# Patient Record
Sex: FEMALE | Race: BLACK OR AFRICAN AMERICAN | NOT HISPANIC OR LATINO | Employment: FULL TIME | URBAN - METROPOLITAN AREA
[De-identification: names, ages, dates, MRNs, and addresses within clinical notes are randomized per-mention and may not be internally consistent; named-entity substitution may affect disease eponyms.]

---

## 2020-06-30 ENCOUNTER — OFFICE VISIT (OUTPATIENT)
Dept: URGENT CARE | Facility: CLINIC | Age: 25
End: 2020-06-30
Payer: COMMERCIAL

## 2020-06-30 VITALS
WEIGHT: 160 LBS | HEART RATE: 98 BPM | BODY MASS INDEX: 28.35 KG/M2 | HEIGHT: 63 IN | SYSTOLIC BLOOD PRESSURE: 128 MMHG | DIASTOLIC BLOOD PRESSURE: 86 MMHG | TEMPERATURE: 99 F | OXYGEN SATURATION: 98 %

## 2020-06-30 DIAGNOSIS — M77.9 TENDONITIS: Primary | ICD-10-CM

## 2020-06-30 DIAGNOSIS — M79.672 LEFT FOOT PAIN: ICD-10-CM

## 2020-06-30 PROCEDURE — 99203 OFFICE O/P NEW LOW 30 MIN: CPT | Mod: S$GLB,,, | Performed by: PHYSICIAN ASSISTANT

## 2020-06-30 PROCEDURE — 73630 X-RAY EXAM OF FOOT: CPT | Mod: FY,LT,S$GLB, | Performed by: RADIOLOGY

## 2020-06-30 PROCEDURE — 73610 XR ANKLE COMPLETE 3 VIEW LEFT: ICD-10-PCS | Mod: FY,LT,S$GLB, | Performed by: RADIOLOGY

## 2020-06-30 PROCEDURE — 73610 X-RAY EXAM OF ANKLE: CPT | Mod: FY,LT,S$GLB, | Performed by: RADIOLOGY

## 2020-06-30 PROCEDURE — 99203 PR OFFICE/OUTPT VISIT, NEW, LEVL III, 30-44 MIN: ICD-10-PCS | Mod: S$GLB,,, | Performed by: PHYSICIAN ASSISTANT

## 2020-06-30 PROCEDURE — 73630 XR FOOT COMPLETE 3 VIEW LEFT: ICD-10-PCS | Mod: FY,LT,S$GLB, | Performed by: RADIOLOGY

## 2020-06-30 NOTE — LETTER
June 30, 2020      [facility]  [place]  [place] [address]  Phone: [phone]  Fax: [phone]       Patient: Nae Escalante   YOB: 1995  Date of Visit: 06/30/2020    To Whom It May Concern:    Dana Escalante  was at [facility] on 06/30/2020. She may return to work/school on 6/6/2020 with no restrictions. If you have any questions or concerns, or if I can be of further assistance, please do not hesitate to contact me.    Sincerely,    Jojo Mancilla PA-C

## 2020-06-30 NOTE — PROGRESS NOTES
"Subjective:       Patient ID: Nae Escalante is a 25 y.o. female.    Vitals:  height is 5' 3" (1.6 m) and weight is 72.6 kg (160 lb). Her temperature is 99.2 °F (37.3 °C). Her blood pressure is 128/86 and her pulse is 98. Her oxygen saturation is 98%.     Chief Complaint: Foot Pain    This is a 25-year-old female presents complaining of left ankle pain onset yesterday.  States that she was exercising, doing step ups from lunge to knee high.  He did not feel any immediate pain.  She started with pain a few hours later that felt like soreness around her left lateral malleolus, dorsal foot.  Denies numbness or tingling.  Denies any changes in color.  Reports mild swelling.  She did take ibuprofen with great relief.    Foot Pain  This is a new problem. The current episode started yesterday. The problem occurs constantly. The problem has been unchanged. Pertinent negatives include no arthralgias, chest pain, chills, congestion, coughing, diaphoresis, fatigue, fever, headaches, joint swelling, myalgias, nausea, neck pain, rash, sore throat, vertigo, vomiting or weakness. The symptoms are aggravated by walking. She has tried acetaminophen for the symptoms. The treatment provided no relief.       Constitution: Negative for chills, sweating, fatigue and fever.   HENT: Negative for tinnitus, facial swelling, congestion, sinus pain and sore throat.    Neck: Negative for neck pain, neck stiffness and painful lymph nodes.   Cardiovascular: Negative for chest pain and leg swelling.   Eyes: Negative for eye pain, photophobia, vision loss, double vision and blurred vision.   Respiratory: Negative for cough and shortness of breath.    Gastrointestinal: Negative for nausea, vomiting and diarrhea.   Genitourinary: Negative for dysuria, frequency, urgency, history of kidney stones and missed menses.   Musculoskeletal: Negative for trauma, joint pain, joint swelling, muscle cramps and muscle ache.   Skin: Negative for color change, " pale, rash, wound, lesion and bruising.   Allergic/Immunologic: Negative for seasonal allergies.   Neurological: Negative for dizziness, history of vertigo, light-headedness, passing out, facial drooping, speech difficulty, coordination disturbances, loss of balance, headaches, history of migraines, disorientation and loss of consciousness.   Hematologic/Lymphatic: Negative for swollen lymph nodes.   Psychiatric/Behavioral: Negative for disorientation, confusion, nervous/anxious, sleep disturbance and depression. The patient is not nervous/anxious.        Objective:      Physical Exam   Constitutional: She is oriented to person, place, and time. She appears well-developed. No distress.   HENT:   Head: Normocephalic and atraumatic.   Right Ear: External ear normal.   Left Ear: External ear normal.   Eyes: Conjunctivae are normal.   Neck: Normal range of motion. Neck supple.   Cardiovascular: Normal rate and regular rhythm.   Pulmonary/Chest: Effort normal and breath sounds normal.   Abdominal: She exhibits no distension.   Musculoskeletal:      Left ankle: Achilles tendon normal.      Comments: There is mild swelling to the left dorsal foot.  There is mild tenderness to the left lateral malleolus, 3rd metatarsal, 5th metatarsal.  No extreme point tenderness.    2+ DP pulses bilaterally  Sensation intact bilaterally    Full active range of motion bilateral ankles on dorsiflexion and plantar flexion as well as inversion and eversion.  Mild pain in left ankle on these movements.  Able to ambulate without difficulty    Achilles tendon is intact   Neurological: She is alert and oriented to person, place, and time.   Skin: Skin is warm, dry and not diaphoretic. Capillary refill takes less than 2 seconds.   Psychiatric: Her behavior is normal. Judgment and thought content normal.   Nursing note and vitals reviewed.  X-ray Ankle Complete Left    Result Date: 6/30/2020  EXAMINATION: XR FOOT COMPLETE 3 VIEW LEFT; XR ANKLE  COMPLETE 3 VIEW LEFT CLINICAL HISTORY: overuse injury, pain over lat malleolus as well as 5th metatarsal and 3rd metatarsal;.  Pain in left foot TECHNIQUE: AP, lateral and oblique views of the left foot were performed.  Three views of the left ankle also. COMPARISON: None FINDINGS: Alignment is satisfactory.  No acute fracture, subluxation or dislocation.  No mass or foreign body.  No significant arthropathy. Ankle mortise is intact.  Soft tissues appear adequately maintained. No acute abnormality of the left foot or ankle.     No acute radiographic abnormality. Electronically signed by: Grzegorz Vernon Date:    06/30/2020 Time:    16:51    X-ray Foot Complete Left    Result Date: 6/30/2020  EXAMINATION: XR FOOT COMPLETE 3 VIEW LEFT; XR ANKLE COMPLETE 3 VIEW LEFT CLINICAL HISTORY: overuse injury, pain over lat malleolus as well as 5th metatarsal and 3rd metatarsal;.  Pain in left foot TECHNIQUE: AP, lateral and oblique views of the left foot were performed.  Three views of the left ankle also. COMPARISON: None FINDINGS: Alignment is satisfactory.  No acute fracture, subluxation or dislocation.  No mass or foreign body.  No significant arthropathy. Ankle mortise is intact.  Soft tissues appear adequately maintained. No acute abnormality of the left foot or ankle.     No acute radiographic abnormality. Electronically signed by: Grzegorz Vernon Date:    06/30/2020 Time:    16:51        Assessment:       1. Tendonitis    2. Left foot pain        Plan:       Recommend crutches, RICE  1 wk pcp follow up  ED precautions return precautions discussed    Tendonitis    Left foot pain  -     X-Ray Ankle Complete Left; Future; Expected date: 06/30/2020  -     X-Ray Foot Complete Left; Future; Expected date: 06/30/2020  -     Cancel: CRUTCHES FOR HOME USE    Labs reviewed, pertinent imaging reviewed, previous medical records, medical history, surgical history, social history, family history reviewed.       Patient Instructions    Rest  Ice  Compression  Elevation above the level of your heart  Crutches for 1 week  Follow-up primary care in 1 week    Please arrange follow up with your primary medical clinic as soon as possible. You must understand that you've received an Urgent Care treatment only and that you may be released before all of your medical problems are known or treated. You, the patient, will arrange for follow up as instructed. If your symptoms worsen or fail to improve you should go to the Emergency Room.  WE CANNOT RULE OUT ALL POSSIBLE CAUSES OF YOUR SYMPTOMS IN THE URGENT CARE SETTING PLEASE GO TO THE ER IF YOU FEELS YOUR CONDITION IS WORSENING OR YOU WOULD LIKE EMERGENT EVALUATION.          Tendonitis  A tendon is the thick fibrous cord that joins muscle to bone and allows joints to move. When a tendon becomes inflamed, it is called tendonitis. This can occur from overuse, injury, or infection. This usually involves the shoulders, forearm, wrist, hands and foot. Symptoms include pain, swelling and tenderness to the touch. Moving the joint increases the pain.  It takes 4 to 6 weeks for tendonitis to heal. It is treated by preventing motion of the tendon with a splint or brace and the use of anti-inflammatory medicine.  Home care  · Some people find relief with ice packs. These can be crushed or cubed ice in a plastic bag or a bag of frozen vegetables wrapped in a thin towel. Other people get better relief with heat. This can include a hot shower, hot bath, or a moist towel warmed in a microwave. Try each and use the method that feels best, for 15 to 20 minutes several times a day.  · Rest the inflamed joint and protect it from movement.  · You may use over-the-counter ibuprofen or naproxen to treat pain and inflammation, unless another medicine was prescribed. If you can't take these medicines, acetaminophen may help with the pain, but does not treat inflammation. If you have chronic liver or kidney disease or ever had a  stomach ulcer or gastrointestinal bleeding, talk with your doctor before using these medicines.  · As your symptoms improve, begin gradual motion at the involved joint.  Follow-up care  Follow up with your healthcare provider if you are not improving after 5 days of treatment.  When to seek medical advice  Call your healthcare provider right away if any of these occur:  · Redness over the painful area  · Increasing pain or swelling at the joint  · Fever (1 degree above your normal temperature) lasting 24 to 48 hours Or, whatever your healthcare provider told you to report based on your condition  Date Last Reviewed: 11/21/2015  © 6206-9289 [employer]. [address] [place] [address]. All rights reserved. This information is not intended as a substitute for professional medical care. Always follow your healthcare professional's instructions.

## 2020-06-30 NOTE — PATIENT INSTRUCTIONS
Rest  Ice  Compression  Elevation above the level of your heart  Crutches for 1 week  Follow-up primary care in 1 week    Please arrange follow up with your primary medical clinic as soon as possible. You must understand that you've received an Urgent Care treatment only and that you may be released before all of your medical problems are known or treated. You, the patient, will arrange for follow up as instructed. If your symptoms worsen or fail to improve you should go to the Emergency Room.  WE CANNOT RULE OUT ALL POSSIBLE CAUSES OF YOUR SYMPTOMS IN THE URGENT CARE SETTING PLEASE GO TO THE ER IF YOU FEELS YOUR CONDITION IS WORSENING OR YOU WOULD LIKE EMERGENT EVALUATION.          Tendonitis  A tendon is the thick fibrous cord that joins muscle to bone and allows joints to move. When a tendon becomes inflamed, it is called tendonitis. This can occur from overuse, injury, or infection. This usually involves the shoulders, forearm, wrist, hands and foot. Symptoms include pain, swelling and tenderness to the touch. Moving the joint increases the pain.  It takes 4 to 6 weeks for tendonitis to heal. It is treated by preventing motion of the tendon with a splint or brace and the use of anti-inflammatory medicine.  Home care  · Some people find relief with ice packs. These can be crushed or cubed ice in a plastic bag or a bag of frozen vegetables wrapped in a thin towel. Other people get better relief with heat. This can include a hot shower, hot bath, or a moist towel warmed in a microwave. Try each and use the method that feels best, for 15 to 20 minutes several times a day.  · Rest the inflamed joint and protect it from movement.  · You may use over-the-counter ibuprofen or naproxen to treat pain and inflammation, unless another medicine was prescribed. If you can't take these medicines, acetaminophen may help with the pain, but does not treat inflammation. If you have chronic liver or kidney disease or ever had a  stomach ulcer or gastrointestinal bleeding, talk with your doctor before using these medicines.  · As your symptoms improve, begin gradual motion at the involved joint.  Follow-up care  Follow up with your healthcare provider if you are not improving after 5 days of treatment.  When to seek medical advice  Call your healthcare provider right away if any of these occur:  · Redness over the painful area  · Increasing pain or swelling at the joint  · Fever (1 degree above your normal temperature) lasting 24 to 48 hours Or, whatever your healthcare provider told you to report based on your condition  Date Last Reviewed: 11/21/2015  © 3289-9741 [employer]. [address] [place] [address]. All rights reserved. This information is not intended as a substitute for professional medical care. Always follow your healthcare professional's instructions.

## 2020-12-04 DIAGNOSIS — Z01.84 ANTIBODY RESPONSE EXAMINATION: ICD-10-CM

## 2021-01-10 ENCOUNTER — OFFICE VISIT (OUTPATIENT)
Dept: URGENT CARE | Facility: CLINIC | Age: 26
End: 2021-01-10
Payer: COMMERCIAL

## 2021-01-10 VITALS
HEART RATE: 98 BPM | HEIGHT: 63 IN | TEMPERATURE: 99 F | BODY MASS INDEX: 29.23 KG/M2 | SYSTOLIC BLOOD PRESSURE: 132 MMHG | WEIGHT: 165 LBS | DIASTOLIC BLOOD PRESSURE: 85 MMHG | RESPIRATION RATE: 18 BRPM | OXYGEN SATURATION: 98 %

## 2021-01-10 DIAGNOSIS — M25.511 RIGHT SHOULDER PAIN, UNSPECIFIED CHRONICITY: Primary | ICD-10-CM

## 2021-01-10 PROCEDURE — 99214 PR OFFICE/OUTPT VISIT, EST, LEVL IV, 30-39 MIN: ICD-10-PCS | Mod: S$GLB,,, | Performed by: NURSE PRACTITIONER

## 2021-01-10 PROCEDURE — 73030 X-RAY EXAM OF SHOULDER: CPT | Mod: FY,RT,S$GLB, | Performed by: RADIOLOGY

## 2021-01-10 PROCEDURE — 99214 OFFICE O/P EST MOD 30 MIN: CPT | Mod: S$GLB,,, | Performed by: NURSE PRACTITIONER

## 2021-01-10 PROCEDURE — 73030 XR SHOULDER COMPLETE 2 OR MORE VIEWS RIGHT: ICD-10-PCS | Mod: FY,RT,S$GLB, | Performed by: RADIOLOGY

## 2021-01-10 PROCEDURE — 3008F BODY MASS INDEX DOCD: CPT | Mod: CPTII,S$GLB,, | Performed by: NURSE PRACTITIONER

## 2021-01-10 PROCEDURE — 3008F PR BODY MASS INDEX (BMI) DOCUMENTED: ICD-10-PCS | Mod: CPTII,S$GLB,, | Performed by: NURSE PRACTITIONER

## 2021-01-10 RX ORDER — IBUPROFEN 600 MG/1
600 TABLET ORAL 3 TIMES DAILY PRN
Qty: 30 TABLET | Refills: 0 | Status: SHIPPED | OUTPATIENT
Start: 2021-01-10 | End: 2021-10-27 | Stop reason: ALTCHOICE

## 2021-01-11 ENCOUNTER — OFFICE VISIT (OUTPATIENT)
Dept: ORTHOPEDICS | Facility: CLINIC | Age: 26
End: 2021-01-11
Payer: COMMERCIAL

## 2021-01-11 VITALS — WEIGHT: 165 LBS | BODY MASS INDEX: 29.23 KG/M2 | HEIGHT: 63 IN

## 2021-01-11 DIAGNOSIS — M75.41 IMPINGEMENT SYNDROME OF RIGHT SHOULDER: ICD-10-CM

## 2021-01-11 PROCEDURE — 99999 PR PBB SHADOW E&M-EST. PATIENT-LVL III: ICD-10-PCS | Mod: PBBFAC,,, | Performed by: ORTHOPAEDIC SURGERY

## 2021-01-11 PROCEDURE — 99203 PR OFFICE/OUTPT VISIT, NEW, LEVL III, 30-44 MIN: ICD-10-PCS | Mod: 25,S$GLB,, | Performed by: ORTHOPAEDIC SURGERY

## 2021-01-11 PROCEDURE — 3008F PR BODY MASS INDEX (BMI) DOCUMENTED: ICD-10-PCS | Mod: CPTII,S$GLB,, | Performed by: ORTHOPAEDIC SURGERY

## 2021-01-11 PROCEDURE — 20610 DRAIN/INJ JOINT/BURSA W/O US: CPT | Mod: RT,S$GLB,, | Performed by: ORTHOPAEDIC SURGERY

## 2021-01-11 PROCEDURE — 99999 PR PBB SHADOW E&M-EST. PATIENT-LVL III: CPT | Mod: PBBFAC,,, | Performed by: ORTHOPAEDIC SURGERY

## 2021-01-11 PROCEDURE — 3008F BODY MASS INDEX DOCD: CPT | Mod: CPTII,S$GLB,, | Performed by: ORTHOPAEDIC SURGERY

## 2021-01-11 PROCEDURE — 1125F PR PAIN SEVERITY QUANTIFIED, PAIN PRESENT: ICD-10-PCS | Mod: S$GLB,,, | Performed by: ORTHOPAEDIC SURGERY

## 2021-01-11 PROCEDURE — 20610 PR DRAIN/INJECT LARGE JOINT/BURSA: ICD-10-PCS | Mod: RT,S$GLB,, | Performed by: ORTHOPAEDIC SURGERY

## 2021-01-11 PROCEDURE — 99203 OFFICE O/P NEW LOW 30 MIN: CPT | Mod: 25,S$GLB,, | Performed by: ORTHOPAEDIC SURGERY

## 2021-01-11 PROCEDURE — 1125F AMNT PAIN NOTED PAIN PRSNT: CPT | Mod: S$GLB,,, | Performed by: ORTHOPAEDIC SURGERY

## 2021-01-11 RX ORDER — TRAMADOL HYDROCHLORIDE 50 MG/1
50 TABLET ORAL EVERY 6 HOURS PRN
Qty: 40 TABLET | Refills: 0 | Status: SHIPPED | OUTPATIENT
Start: 2021-01-11 | End: 2021-01-21

## 2021-01-11 RX ORDER — TRIAMCINOLONE ACETONIDE 40 MG/ML
40 INJECTION, SUSPENSION INTRA-ARTICULAR; INTRAMUSCULAR
Status: COMPLETED | OUTPATIENT
Start: 2021-01-11 | End: 2021-01-11

## 2021-01-11 RX ADMIN — TRIAMCINOLONE ACETONIDE 40 MG: 40 INJECTION, SUSPENSION INTRA-ARTICULAR; INTRAMUSCULAR at 04:01

## 2021-01-25 ENCOUNTER — PATIENT MESSAGE (OUTPATIENT)
Dept: ORTHOPEDICS | Facility: CLINIC | Age: 26
End: 2021-01-25

## 2021-01-26 DIAGNOSIS — M25.519 SHOULDER PAIN, UNSPECIFIED CHRONICITY, UNSPECIFIED LATERALITY: ICD-10-CM

## 2021-02-02 ENCOUNTER — TELEPHONE (OUTPATIENT)
Dept: ORTHOPEDICS | Facility: CLINIC | Age: 26
End: 2021-02-02

## 2021-02-03 ENCOUNTER — TELEPHONE (OUTPATIENT)
Dept: ORTHOPEDICS | Facility: CLINIC | Age: 26
End: 2021-02-03

## 2021-02-10 ENCOUNTER — IMMUNIZATION (OUTPATIENT)
Dept: PRIMARY CARE CLINIC | Facility: CLINIC | Age: 26
End: 2021-02-10
Payer: COMMERCIAL

## 2021-02-10 DIAGNOSIS — Z23 NEED FOR VACCINATION: Primary | ICD-10-CM

## 2021-02-10 PROCEDURE — 91300 PR SARS-COV- 2 COVID-19 VACCINE, NO PRSV, 30MCG/0.3ML, IM: CPT | Mod: PBBFAC | Performed by: INTERNAL MEDICINE

## 2021-02-10 PROCEDURE — 0001A PR IMMUNIZ ADMIN, SARS-COV-2 COVID-19 VACC, 30MCG/0.3ML, 1ST DOSE: CPT | Mod: PBBFAC | Performed by: INTERNAL MEDICINE

## 2021-02-10 RX ADMIN — RNA INGREDIENT BNT-162B2 0.3 ML: 0.23 INJECTION, SUSPENSION INTRAMUSCULAR at 02:02

## 2021-02-25 ENCOUNTER — OFFICE VISIT (OUTPATIENT)
Dept: ORTHOPEDICS | Facility: CLINIC | Age: 26
End: 2021-02-25
Payer: COMMERCIAL

## 2021-02-25 VITALS — WEIGHT: 165 LBS | HEIGHT: 63 IN | BODY MASS INDEX: 29.23 KG/M2

## 2021-02-25 DIAGNOSIS — M25.311 INSTABILITY OF BOTH SHOULDER JOINTS: Primary | ICD-10-CM

## 2021-02-25 DIAGNOSIS — M25.312 INSTABILITY OF BOTH SHOULDER JOINTS: Primary | ICD-10-CM

## 2021-02-25 PROCEDURE — 1126F PR PAIN SEVERITY QUANTIFIED, NO PAIN PRESENT: ICD-10-PCS | Mod: S$GLB,,, | Performed by: ORTHOPAEDIC SURGERY

## 2021-02-25 PROCEDURE — 99999 PR PBB SHADOW E&M-EST. PATIENT-LVL III: CPT | Mod: PBBFAC,,, | Performed by: ORTHOPAEDIC SURGERY

## 2021-02-25 PROCEDURE — 3008F PR BODY MASS INDEX (BMI) DOCUMENTED: ICD-10-PCS | Mod: CPTII,S$GLB,, | Performed by: ORTHOPAEDIC SURGERY

## 2021-02-25 PROCEDURE — 99999 PR PBB SHADOW E&M-EST. PATIENT-LVL III: ICD-10-PCS | Mod: PBBFAC,,, | Performed by: ORTHOPAEDIC SURGERY

## 2021-02-25 PROCEDURE — 99213 PR OFFICE/OUTPT VISIT, EST, LEVL III, 20-29 MIN: ICD-10-PCS | Mod: S$GLB,,, | Performed by: ORTHOPAEDIC SURGERY

## 2021-02-25 PROCEDURE — 99213 OFFICE O/P EST LOW 20 MIN: CPT | Mod: S$GLB,,, | Performed by: ORTHOPAEDIC SURGERY

## 2021-02-25 PROCEDURE — 3008F BODY MASS INDEX DOCD: CPT | Mod: CPTII,S$GLB,, | Performed by: ORTHOPAEDIC SURGERY

## 2021-02-25 PROCEDURE — 1126F AMNT PAIN NOTED NONE PRSNT: CPT | Mod: S$GLB,,, | Performed by: ORTHOPAEDIC SURGERY

## 2021-03-03 ENCOUNTER — IMMUNIZATION (OUTPATIENT)
Dept: PRIMARY CARE CLINIC | Facility: CLINIC | Age: 26
End: 2021-03-03
Payer: COMMERCIAL

## 2021-03-03 DIAGNOSIS — Z23 NEED FOR VACCINATION: Primary | ICD-10-CM

## 2021-03-03 PROCEDURE — 91300 PR SARS-COV- 2 COVID-19 VACCINE, NO PRSV, 30MCG/0.3ML, IM: CPT | Mod: S$GLB,,, | Performed by: INTERNAL MEDICINE

## 2021-03-03 PROCEDURE — 0002A PR IMMUNIZ ADMIN, SARS-COV-2 COVID-19 VACC, 30MCG/0.3ML, 2ND DOSE: CPT | Mod: CV19,S$GLB,, | Performed by: INTERNAL MEDICINE

## 2021-03-03 PROCEDURE — 91300 PR SARS-COV- 2 COVID-19 VACCINE, NO PRSV, 30MCG/0.3ML, IM: ICD-10-PCS | Mod: S$GLB,,, | Performed by: INTERNAL MEDICINE

## 2021-03-03 PROCEDURE — 0002A PR IMMUNIZ ADMIN, SARS-COV-2 COVID-19 VACC, 30MCG/0.3ML, 2ND DOSE: ICD-10-PCS | Mod: CV19,S$GLB,, | Performed by: INTERNAL MEDICINE

## 2021-03-03 RX ADMIN — Medication 0.3 ML: at 03:03

## 2021-03-19 ENCOUNTER — CLINICAL SUPPORT (OUTPATIENT)
Dept: REHABILITATION | Facility: HOSPITAL | Age: 26
End: 2021-03-19
Payer: COMMERCIAL

## 2021-03-19 DIAGNOSIS — M25.312 INSTABILITY OF BOTH SHOULDER JOINTS: ICD-10-CM

## 2021-03-19 DIAGNOSIS — M25.311 INSTABILITY OF BOTH SHOULDER JOINTS: ICD-10-CM

## 2021-03-19 PROCEDURE — 97140 MANUAL THERAPY 1/> REGIONS: CPT

## 2021-03-19 PROCEDURE — 97161 PT EVAL LOW COMPLEX 20 MIN: CPT

## 2021-03-25 ENCOUNTER — CLINICAL SUPPORT (OUTPATIENT)
Dept: REHABILITATION | Facility: HOSPITAL | Age: 26
End: 2021-03-25
Payer: COMMERCIAL

## 2021-03-25 DIAGNOSIS — M25.511 RIGHT SHOULDER PAIN, UNSPECIFIED CHRONICITY: Primary | ICD-10-CM

## 2021-03-25 PROCEDURE — 97110 THERAPEUTIC EXERCISES: CPT

## 2021-03-25 PROCEDURE — 97140 MANUAL THERAPY 1/> REGIONS: CPT

## 2021-04-01 ENCOUNTER — CLINICAL SUPPORT (OUTPATIENT)
Dept: REHABILITATION | Facility: HOSPITAL | Age: 26
End: 2021-04-01
Payer: COMMERCIAL

## 2021-04-01 DIAGNOSIS — M25.511 RIGHT SHOULDER PAIN, UNSPECIFIED CHRONICITY: Primary | ICD-10-CM

## 2021-04-01 PROCEDURE — 97140 MANUAL THERAPY 1/> REGIONS: CPT

## 2021-04-01 PROCEDURE — 97110 THERAPEUTIC EXERCISES: CPT

## 2021-04-06 ENCOUNTER — PATIENT MESSAGE (OUTPATIENT)
Dept: OBSTETRICS AND GYNECOLOGY | Facility: CLINIC | Age: 26
End: 2021-04-06

## 2021-04-08 ENCOUNTER — CLINICAL SUPPORT (OUTPATIENT)
Dept: REHABILITATION | Facility: HOSPITAL | Age: 26
End: 2021-04-08
Payer: COMMERCIAL

## 2021-04-08 DIAGNOSIS — M25.511 RIGHT SHOULDER PAIN, UNSPECIFIED CHRONICITY: Primary | ICD-10-CM

## 2021-04-08 PROCEDURE — 97110 THERAPEUTIC EXERCISES: CPT

## 2021-04-08 PROCEDURE — 97140 MANUAL THERAPY 1/> REGIONS: CPT

## 2021-04-09 ENCOUNTER — OFFICE VISIT (OUTPATIENT)
Dept: OBSTETRICS AND GYNECOLOGY | Facility: CLINIC | Age: 26
End: 2021-04-09
Payer: COMMERCIAL

## 2021-04-09 VITALS
HEIGHT: 63 IN | HEART RATE: 65 BPM | WEIGHT: 170.94 LBS | SYSTOLIC BLOOD PRESSURE: 122 MMHG | DIASTOLIC BLOOD PRESSURE: 70 MMHG | BODY MASS INDEX: 30.29 KG/M2

## 2021-04-09 DIAGNOSIS — Z12.4 SCREENING FOR CERVICAL CANCER: ICD-10-CM

## 2021-04-09 DIAGNOSIS — Z01.419 WELL WOMAN EXAM WITH ROUTINE GYNECOLOGICAL EXAM: Primary | ICD-10-CM

## 2021-04-09 PROCEDURE — 3008F PR BODY MASS INDEX (BMI) DOCUMENTED: ICD-10-PCS | Mod: CPTII,S$GLB,, | Performed by: OBSTETRICS & GYNECOLOGY

## 2021-04-09 PROCEDURE — 1126F AMNT PAIN NOTED NONE PRSNT: CPT | Mod: S$GLB,,, | Performed by: OBSTETRICS & GYNECOLOGY

## 2021-04-09 PROCEDURE — 99385 PR PREVENTIVE VISIT,NEW,18-39: ICD-10-PCS | Mod: S$GLB,,, | Performed by: OBSTETRICS & GYNECOLOGY

## 2021-04-09 PROCEDURE — 99385 PREV VISIT NEW AGE 18-39: CPT | Mod: S$GLB,,, | Performed by: OBSTETRICS & GYNECOLOGY

## 2021-04-09 PROCEDURE — 1126F PR PAIN SEVERITY QUANTIFIED, NO PAIN PRESENT: ICD-10-PCS | Mod: S$GLB,,, | Performed by: OBSTETRICS & GYNECOLOGY

## 2021-04-09 PROCEDURE — 88175 CYTOPATH C/V AUTO FLUID REDO: CPT | Performed by: OBSTETRICS & GYNECOLOGY

## 2021-04-09 PROCEDURE — 3008F BODY MASS INDEX DOCD: CPT | Mod: CPTII,S$GLB,, | Performed by: OBSTETRICS & GYNECOLOGY

## 2021-04-09 PROCEDURE — 99999 PR PBB SHADOW E&M-EST. PATIENT-LVL III: ICD-10-PCS | Mod: PBBFAC,,, | Performed by: OBSTETRICS & GYNECOLOGY

## 2021-04-09 PROCEDURE — 99999 PR PBB SHADOW E&M-EST. PATIENT-LVL III: CPT | Mod: PBBFAC,,, | Performed by: OBSTETRICS & GYNECOLOGY

## 2021-04-22 ENCOUNTER — CLINICAL SUPPORT (OUTPATIENT)
Dept: REHABILITATION | Facility: HOSPITAL | Age: 26
End: 2021-04-22
Payer: COMMERCIAL

## 2021-04-22 DIAGNOSIS — M25.511 RIGHT SHOULDER PAIN, UNSPECIFIED CHRONICITY: Primary | ICD-10-CM

## 2021-04-22 PROCEDURE — 97110 THERAPEUTIC EXERCISES: CPT

## 2021-04-22 PROCEDURE — 97140 MANUAL THERAPY 1/> REGIONS: CPT

## 2021-04-27 ENCOUNTER — LAB VISIT (OUTPATIENT)
Dept: LAB | Facility: HOSPITAL | Age: 26
End: 2021-04-27
Attending: STUDENT IN AN ORGANIZED HEALTH CARE EDUCATION/TRAINING PROGRAM
Payer: COMMERCIAL

## 2021-04-27 ENCOUNTER — OFFICE VISIT (OUTPATIENT)
Dept: FAMILY MEDICINE | Facility: CLINIC | Age: 26
End: 2021-04-27
Payer: COMMERCIAL

## 2021-04-27 VITALS
RESPIRATION RATE: 17 BRPM | DIASTOLIC BLOOD PRESSURE: 70 MMHG | WEIGHT: 171.94 LBS | HEIGHT: 63 IN | TEMPERATURE: 98 F | HEART RATE: 93 BPM | SYSTOLIC BLOOD PRESSURE: 120 MMHG | OXYGEN SATURATION: 99 % | BODY MASS INDEX: 30.46 KG/M2

## 2021-04-27 DIAGNOSIS — Z76.89 ESTABLISHING CARE WITH NEW DOCTOR, ENCOUNTER FOR: Primary | ICD-10-CM

## 2021-04-27 DIAGNOSIS — Z00.00 WELL ADULT EXAM: ICD-10-CM

## 2021-04-27 LAB
25(OH)D3+25(OH)D2 SERPL-MCNC: 8 NG/ML (ref 30–96)
ALBUMIN SERPL BCP-MCNC: 3.6 G/DL (ref 3.5–5.2)
ALP SERPL-CCNC: 64 U/L (ref 55–135)
ALT SERPL W/O P-5'-P-CCNC: 14 U/L (ref 10–44)
ANION GAP SERPL CALC-SCNC: 8 MMOL/L (ref 8–16)
AST SERPL-CCNC: 18 U/L (ref 10–40)
BASOPHILS # BLD AUTO: 0.01 K/UL (ref 0–0.2)
BASOPHILS NFR BLD: 0.2 % (ref 0–1.9)
BILIRUB SERPL-MCNC: 0.2 MG/DL (ref 0.1–1)
BUN SERPL-MCNC: 14 MG/DL (ref 6–20)
CALCIUM SERPL-MCNC: 8.8 MG/DL (ref 8.7–10.5)
CHLORIDE SERPL-SCNC: 106 MMOL/L (ref 95–110)
CHOLEST SERPL-MCNC: 190 MG/DL (ref 120–199)
CHOLEST/HDLC SERPL: 3.2 {RATIO} (ref 2–5)
CO2 SERPL-SCNC: 25 MMOL/L (ref 23–29)
CREAT SERPL-MCNC: 0.8 MG/DL (ref 0.5–1.4)
DIFFERENTIAL METHOD: ABNORMAL
EOSINOPHIL # BLD AUTO: 0.1 K/UL (ref 0–0.5)
EOSINOPHIL NFR BLD: 1.2 % (ref 0–8)
ERYTHROCYTE [DISTWIDTH] IN BLOOD BY AUTOMATED COUNT: 14 % (ref 11.5–14.5)
EST. GFR  (AFRICAN AMERICAN): >60 ML/MIN/1.73 M^2
EST. GFR  (NON AFRICAN AMERICAN): >60 ML/MIN/1.73 M^2
ESTIMATED AVG GLUCOSE: 120 MG/DL (ref 68–131)
GLUCOSE SERPL-MCNC: 98 MG/DL (ref 70–110)
HBA1C MFR BLD: 5.8 % (ref 4–5.6)
HCT VFR BLD AUTO: 34 % (ref 37–48.5)
HDLC SERPL-MCNC: 59 MG/DL (ref 40–75)
HDLC SERPL: 31.1 % (ref 20–50)
HGB BLD-MCNC: 10.7 G/DL (ref 12–16)
IMM GRANULOCYTES # BLD AUTO: 0.01 K/UL (ref 0–0.04)
IMM GRANULOCYTES NFR BLD AUTO: 0.2 % (ref 0–0.5)
LDLC SERPL CALC-MCNC: 120.6 MG/DL (ref 63–159)
LYMPHOCYTES # BLD AUTO: 1.9 K/UL (ref 1–4.8)
LYMPHOCYTES NFR BLD: 38.6 % (ref 18–48)
MCH RBC QN AUTO: 29.2 PG (ref 27–31)
MCHC RBC AUTO-ENTMCNC: 31.5 G/DL (ref 32–36)
MCV RBC AUTO: 93 FL (ref 82–98)
MONOCYTES # BLD AUTO: 0.4 K/UL (ref 0.3–1)
MONOCYTES NFR BLD: 7.7 % (ref 4–15)
NEUTROPHILS # BLD AUTO: 2.5 K/UL (ref 1.8–7.7)
NEUTROPHILS NFR BLD: 52.1 % (ref 38–73)
NONHDLC SERPL-MCNC: 131 MG/DL
NRBC BLD-RTO: 0 /100 WBC
PLATELET # BLD AUTO: 373 K/UL (ref 150–450)
PMV BLD AUTO: 9.6 FL (ref 9.2–12.9)
POTASSIUM SERPL-SCNC: 4.3 MMOL/L (ref 3.5–5.1)
PROT SERPL-MCNC: 7.8 G/DL (ref 6–8.4)
RBC # BLD AUTO: 3.66 M/UL (ref 4–5.4)
SODIUM SERPL-SCNC: 139 MMOL/L (ref 136–145)
TRIGL SERPL-MCNC: 52 MG/DL (ref 30–150)
TSH SERPL DL<=0.005 MIU/L-ACNC: 1.06 UIU/ML (ref 0.4–4)
WBC # BLD AUTO: 4.82 K/UL (ref 3.9–12.7)

## 2021-04-27 PROCEDURE — 99395 PREV VISIT EST AGE 18-39: CPT | Mod: S$GLB,,, | Performed by: STUDENT IN AN ORGANIZED HEALTH CARE EDUCATION/TRAINING PROGRAM

## 2021-04-27 PROCEDURE — 99999 PR PBB SHADOW E&M-EST. PATIENT-LVL IV: CPT | Mod: PBBFAC,,, | Performed by: STUDENT IN AN ORGANIZED HEALTH CARE EDUCATION/TRAINING PROGRAM

## 2021-04-27 PROCEDURE — 3008F BODY MASS INDEX DOCD: CPT | Mod: CPTII,S$GLB,, | Performed by: STUDENT IN AN ORGANIZED HEALTH CARE EDUCATION/TRAINING PROGRAM

## 2021-04-27 PROCEDURE — 84443 ASSAY THYROID STIM HORMONE: CPT | Performed by: STUDENT IN AN ORGANIZED HEALTH CARE EDUCATION/TRAINING PROGRAM

## 2021-04-27 PROCEDURE — 82306 VITAMIN D 25 HYDROXY: CPT | Performed by: STUDENT IN AN ORGANIZED HEALTH CARE EDUCATION/TRAINING PROGRAM

## 2021-04-27 PROCEDURE — 85025 COMPLETE CBC W/AUTO DIFF WBC: CPT | Performed by: STUDENT IN AN ORGANIZED HEALTH CARE EDUCATION/TRAINING PROGRAM

## 2021-04-27 PROCEDURE — 3008F PR BODY MASS INDEX (BMI) DOCUMENTED: ICD-10-PCS | Mod: CPTII,S$GLB,, | Performed by: STUDENT IN AN ORGANIZED HEALTH CARE EDUCATION/TRAINING PROGRAM

## 2021-04-27 PROCEDURE — 80053 COMPREHEN METABOLIC PANEL: CPT | Performed by: STUDENT IN AN ORGANIZED HEALTH CARE EDUCATION/TRAINING PROGRAM

## 2021-04-27 PROCEDURE — 80061 LIPID PANEL: CPT | Performed by: STUDENT IN AN ORGANIZED HEALTH CARE EDUCATION/TRAINING PROGRAM

## 2021-04-27 PROCEDURE — 99999 PR PBB SHADOW E&M-EST. PATIENT-LVL IV: ICD-10-PCS | Mod: PBBFAC,,, | Performed by: STUDENT IN AN ORGANIZED HEALTH CARE EDUCATION/TRAINING PROGRAM

## 2021-04-27 PROCEDURE — 99395 PR PREVENTIVE VISIT,EST,18-39: ICD-10-PCS | Mod: S$GLB,,, | Performed by: STUDENT IN AN ORGANIZED HEALTH CARE EDUCATION/TRAINING PROGRAM

## 2021-04-27 PROCEDURE — 36415 COLL VENOUS BLD VENIPUNCTURE: CPT | Mod: PO | Performed by: STUDENT IN AN ORGANIZED HEALTH CARE EDUCATION/TRAINING PROGRAM

## 2021-04-27 PROCEDURE — 83036 HEMOGLOBIN GLYCOSYLATED A1C: CPT | Performed by: STUDENT IN AN ORGANIZED HEALTH CARE EDUCATION/TRAINING PROGRAM

## 2021-04-28 DIAGNOSIS — E55.9 VITAMIN D DEFICIENCY: Primary | ICD-10-CM

## 2021-04-28 DIAGNOSIS — R73.03 PRE-DIABETES: ICD-10-CM

## 2021-04-28 RX ORDER — ERGOCALCIFEROL 1.25 MG/1
50000 CAPSULE ORAL
Qty: 12 CAPSULE | Refills: 0 | Status: SHIPPED | OUTPATIENT
Start: 2021-04-28 | End: 2022-04-27 | Stop reason: SDUPTHER

## 2021-04-29 ENCOUNTER — CLINICAL SUPPORT (OUTPATIENT)
Dept: REHABILITATION | Facility: HOSPITAL | Age: 26
End: 2021-04-29
Payer: COMMERCIAL

## 2021-04-29 DIAGNOSIS — M25.511 RIGHT SHOULDER PAIN, UNSPECIFIED CHRONICITY: Primary | ICD-10-CM

## 2021-04-29 PROCEDURE — 97140 MANUAL THERAPY 1/> REGIONS: CPT

## 2021-04-29 PROCEDURE — 97110 THERAPEUTIC EXERCISES: CPT

## 2021-05-01 ENCOUNTER — HOSPITAL ENCOUNTER (EMERGENCY)
Facility: HOSPITAL | Age: 26
Discharge: HOME | End: 2021-05-01
Attending: EMERGENCY MEDICINE
Payer: COMMERCIAL

## 2021-05-01 VITALS
OXYGEN SATURATION: 99 % | WEIGHT: 170 LBS | HEIGHT: 63 IN | DIASTOLIC BLOOD PRESSURE: 61 MMHG | RESPIRATION RATE: 18 BRPM | TEMPERATURE: 98 F | HEART RATE: 104 BPM | SYSTOLIC BLOOD PRESSURE: 102 MMHG | BODY MASS INDEX: 30.12 KG/M2

## 2021-05-01 DIAGNOSIS — R11.2 NON-INTRACTABLE VOMITING WITH NAUSEA, UNSPECIFIED VOMITING TYPE: ICD-10-CM

## 2021-05-01 DIAGNOSIS — A08.4 VIRAL GASTROENTERITIS: Primary | ICD-10-CM

## 2021-05-01 LAB
ALBUMIN SERPL BCP-MCNC: 4 G/DL (ref 3.5–5.2)
ALP SERPL-CCNC: 66 U/L (ref 55–135)
ALT SERPL W/O P-5'-P-CCNC: 16 U/L (ref 10–44)
ANION GAP SERPL CALC-SCNC: 13 MMOL/L (ref 8–16)
AST SERPL-CCNC: 25 U/L (ref 10–40)
B-HCG UR QL: NEGATIVE
BASOPHILS # BLD AUTO: 0.01 K/UL (ref 0–0.2)
BASOPHILS NFR BLD: 0.1 % (ref 0–1.9)
BILIRUB SERPL-MCNC: 0.5 MG/DL (ref 0.1–1)
BILIRUB UR QL STRIP: NEGATIVE
BUN SERPL-MCNC: 17 MG/DL (ref 6–20)
CALCIUM SERPL-MCNC: 9 MG/DL (ref 8.7–10.5)
CHLORIDE SERPL-SCNC: 102 MMOL/L (ref 95–110)
CLARITY UR: CLEAR
CO2 SERPL-SCNC: 20 MMOL/L (ref 23–29)
COLOR UR: YELLOW
CREAT SERPL-MCNC: 1 MG/DL (ref 0.5–1.4)
CTP QC/QA: YES
DIFFERENTIAL METHOD: ABNORMAL
EOSINOPHIL # BLD AUTO: 0 K/UL (ref 0–0.5)
EOSINOPHIL NFR BLD: 0 % (ref 0–8)
ERYTHROCYTE [DISTWIDTH] IN BLOOD BY AUTOMATED COUNT: 13.3 % (ref 11.5–14.5)
EST. GFR  (AFRICAN AMERICAN): >60 ML/MIN/1.73 M^2
EST. GFR  (NON AFRICAN AMERICAN): >60 ML/MIN/1.73 M^2
GLUCOSE SERPL-MCNC: 132 MG/DL (ref 70–110)
GLUCOSE UR QL STRIP: NEGATIVE
HCT VFR BLD AUTO: 36 % (ref 37–48.5)
HGB BLD-MCNC: 12.1 G/DL (ref 12–16)
HGB UR QL STRIP: NEGATIVE
IMM GRANULOCYTES # BLD AUTO: 0.02 K/UL (ref 0–0.04)
IMM GRANULOCYTES NFR BLD AUTO: 0.2 % (ref 0–0.5)
KETONES UR QL STRIP: ABNORMAL
LEUKOCYTE ESTERASE UR QL STRIP: NEGATIVE
LIPASE SERPL-CCNC: 25 U/L (ref 4–60)
LYMPHOCYTES # BLD AUTO: 0.3 K/UL (ref 1–4.8)
LYMPHOCYTES NFR BLD: 2.7 % (ref 18–48)
MCH RBC QN AUTO: 29.4 PG (ref 27–31)
MCHC RBC AUTO-ENTMCNC: 33.6 G/DL (ref 32–36)
MCV RBC AUTO: 87 FL (ref 82–98)
MONOCYTES # BLD AUTO: 0.2 K/UL (ref 0.3–1)
MONOCYTES NFR BLD: 2.1 % (ref 4–15)
NEUTROPHILS # BLD AUTO: 9.9 K/UL (ref 1.8–7.7)
NEUTROPHILS NFR BLD: 94.9 % (ref 38–73)
NITRITE UR QL STRIP: NEGATIVE
NRBC BLD-RTO: 0 /100 WBC
PH UR STRIP: 6 [PH] (ref 5–8)
PLATELET # BLD AUTO: 358 K/UL (ref 150–450)
PMV BLD AUTO: 9.2 FL (ref 9.2–12.9)
POCT GLUCOSE: 143 MG/DL (ref 70–110)
POTASSIUM SERPL-SCNC: 4.3 MMOL/L (ref 3.5–5.1)
PROT SERPL-MCNC: 8.9 G/DL (ref 6–8.4)
PROT UR QL STRIP: NEGATIVE
RBC # BLD AUTO: 4.12 M/UL (ref 4–5.4)
SODIUM SERPL-SCNC: 135 MMOL/L (ref 136–145)
SP GR UR STRIP: 1.03 (ref 1–1.03)
URN SPEC COLLECT METH UR: ABNORMAL
UROBILINOGEN UR STRIP-ACNC: NEGATIVE EU/DL
WBC # BLD AUTO: 10.41 K/UL (ref 3.9–12.7)

## 2021-05-01 PROCEDURE — 83690 ASSAY OF LIPASE: CPT | Performed by: PHYSICIAN ASSISTANT

## 2021-05-01 PROCEDURE — 63600175 PHARM REV CODE 636 W HCPCS: Performed by: PHYSICIAN ASSISTANT

## 2021-05-01 PROCEDURE — 96375 TX/PRO/DX INJ NEW DRUG ADDON: CPT

## 2021-05-01 PROCEDURE — 25000003 PHARM REV CODE 250: Performed by: PHYSICIAN ASSISTANT

## 2021-05-01 PROCEDURE — 80053 COMPREHEN METABOLIC PANEL: CPT | Performed by: PHYSICIAN ASSISTANT

## 2021-05-01 PROCEDURE — 99284 EMERGENCY DEPT VISIT MOD MDM: CPT | Mod: 25

## 2021-05-01 PROCEDURE — 85025 COMPLETE CBC W/AUTO DIFF WBC: CPT | Performed by: PHYSICIAN ASSISTANT

## 2021-05-01 PROCEDURE — 96361 HYDRATE IV INFUSION ADD-ON: CPT

## 2021-05-01 PROCEDURE — 81025 URINE PREGNANCY TEST: CPT | Performed by: EMERGENCY MEDICINE

## 2021-05-01 PROCEDURE — 81003 URINALYSIS AUTO W/O SCOPE: CPT | Performed by: PHYSICIAN ASSISTANT

## 2021-05-01 PROCEDURE — 82962 GLUCOSE BLOOD TEST: CPT

## 2021-05-01 PROCEDURE — 96374 THER/PROPH/DIAG INJ IV PUSH: CPT

## 2021-05-01 RX ORDER — KETOROLAC TROMETHAMINE 30 MG/ML
15 INJECTION, SOLUTION INTRAMUSCULAR; INTRAVENOUS
Status: COMPLETED | OUTPATIENT
Start: 2021-05-01 | End: 2021-05-01

## 2021-05-01 RX ORDER — ONDANSETRON 4 MG/1
4 TABLET, FILM COATED ORAL EVERY 6 HOURS PRN
Qty: 30 TABLET | Refills: 0 | Status: SHIPPED | OUTPATIENT
Start: 2021-05-01 | End: 2021-10-27 | Stop reason: ALTCHOICE

## 2021-05-01 RX ORDER — ONDANSETRON 2 MG/ML
8 INJECTION INTRAMUSCULAR; INTRAVENOUS
Status: COMPLETED | OUTPATIENT
Start: 2021-05-01 | End: 2021-05-01

## 2021-05-01 RX ADMIN — ONDANSETRON 8 MG: 2 INJECTION INTRAMUSCULAR; INTRAVENOUS at 03:05

## 2021-05-01 RX ADMIN — KETOROLAC TROMETHAMINE 15 MG: 30 INJECTION, SOLUTION INTRAMUSCULAR; INTRAVENOUS at 05:05

## 2021-05-01 RX ADMIN — SODIUM CHLORIDE 1000 ML: 0.9 INJECTION, SOLUTION INTRAVENOUS at 03:05

## 2021-10-14 ENCOUNTER — IMMUNIZATION (OUTPATIENT)
Dept: INTERNAL MEDICINE | Facility: CLINIC | Age: 26
End: 2021-10-14
Payer: COMMERCIAL

## 2021-10-14 DIAGNOSIS — Z23 NEED FOR VACCINATION: Primary | ICD-10-CM

## 2021-10-14 PROCEDURE — 91300 COVID-19, MRNA, LNP-S, PF, 30 MCG/0.3 ML DOSE VACCINE: CPT | Mod: PBBFAC | Performed by: INTERNAL MEDICINE

## 2021-10-14 PROCEDURE — 0003A COVID-19, MRNA, LNP-S, PF, 30 MCG/0.3 ML DOSE VACCINE: CPT | Mod: PBBFAC | Performed by: INTERNAL MEDICINE

## 2021-10-22 ENCOUNTER — PATIENT MESSAGE (OUTPATIENT)
Dept: FAMILY MEDICINE | Facility: CLINIC | Age: 26
End: 2021-10-22
Payer: COMMERCIAL

## 2021-10-27 ENCOUNTER — OFFICE VISIT (OUTPATIENT)
Dept: FAMILY MEDICINE | Facility: CLINIC | Age: 26
End: 2021-10-27
Payer: COMMERCIAL

## 2021-10-27 ENCOUNTER — LAB VISIT (OUTPATIENT)
Dept: LAB | Facility: HOSPITAL | Age: 26
End: 2021-10-27
Attending: STUDENT IN AN ORGANIZED HEALTH CARE EDUCATION/TRAINING PROGRAM
Payer: COMMERCIAL

## 2021-10-27 VITALS
OXYGEN SATURATION: 97 % | DIASTOLIC BLOOD PRESSURE: 60 MMHG | WEIGHT: 178.56 LBS | SYSTOLIC BLOOD PRESSURE: 110 MMHG | HEART RATE: 81 BPM | HEIGHT: 63 IN | BODY MASS INDEX: 31.64 KG/M2

## 2021-10-27 DIAGNOSIS — R73.03 PRE-DIABETES: Primary | ICD-10-CM

## 2021-10-27 DIAGNOSIS — R73.03 PRE-DIABETES: ICD-10-CM

## 2021-10-27 LAB
ESTIMATED AVG GLUCOSE: 111 MG/DL (ref 68–131)
HBA1C MFR BLD: 5.5 % (ref 4–5.6)

## 2021-10-27 PROCEDURE — 36415 COLL VENOUS BLD VENIPUNCTURE: CPT | Mod: PO | Performed by: STUDENT IN AN ORGANIZED HEALTH CARE EDUCATION/TRAINING PROGRAM

## 2021-10-27 PROCEDURE — 3074F SYST BP LT 130 MM HG: CPT | Mod: CPTII,S$GLB,, | Performed by: STUDENT IN AN ORGANIZED HEALTH CARE EDUCATION/TRAINING PROGRAM

## 2021-10-27 PROCEDURE — 3008F PR BODY MASS INDEX (BMI) DOCUMENTED: ICD-10-PCS | Mod: CPTII,S$GLB,, | Performed by: STUDENT IN AN ORGANIZED HEALTH CARE EDUCATION/TRAINING PROGRAM

## 2021-10-27 PROCEDURE — 3078F PR MOST RECENT DIASTOLIC BLOOD PRESSURE < 80 MM HG: ICD-10-PCS | Mod: CPTII,S$GLB,, | Performed by: STUDENT IN AN ORGANIZED HEALTH CARE EDUCATION/TRAINING PROGRAM

## 2021-10-27 PROCEDURE — 3044F HG A1C LEVEL LT 7.0%: CPT | Mod: CPTII,S$GLB,, | Performed by: STUDENT IN AN ORGANIZED HEALTH CARE EDUCATION/TRAINING PROGRAM

## 2021-10-27 PROCEDURE — 1160F PR REVIEW ALL MEDS BY PRESCRIBER/CLIN PHARMACIST DOCUMENTED: ICD-10-PCS | Mod: CPTII,S$GLB,, | Performed by: STUDENT IN AN ORGANIZED HEALTH CARE EDUCATION/TRAINING PROGRAM

## 2021-10-27 PROCEDURE — 99999 PR PBB SHADOW E&M-EST. PATIENT-LVL III: CPT | Mod: PBBFAC,,, | Performed by: STUDENT IN AN ORGANIZED HEALTH CARE EDUCATION/TRAINING PROGRAM

## 2021-10-27 PROCEDURE — 3074F PR MOST RECENT SYSTOLIC BLOOD PRESSURE < 130 MM HG: ICD-10-PCS | Mod: CPTII,S$GLB,, | Performed by: STUDENT IN AN ORGANIZED HEALTH CARE EDUCATION/TRAINING PROGRAM

## 2021-10-27 PROCEDURE — 1159F MED LIST DOCD IN RCRD: CPT | Mod: CPTII,S$GLB,, | Performed by: STUDENT IN AN ORGANIZED HEALTH CARE EDUCATION/TRAINING PROGRAM

## 2021-10-27 PROCEDURE — 1160F RVW MEDS BY RX/DR IN RCRD: CPT | Mod: CPTII,S$GLB,, | Performed by: STUDENT IN AN ORGANIZED HEALTH CARE EDUCATION/TRAINING PROGRAM

## 2021-10-27 PROCEDURE — 83036 HEMOGLOBIN GLYCOSYLATED A1C: CPT | Performed by: STUDENT IN AN ORGANIZED HEALTH CARE EDUCATION/TRAINING PROGRAM

## 2021-10-27 PROCEDURE — 99213 OFFICE O/P EST LOW 20 MIN: CPT | Mod: S$GLB,,, | Performed by: STUDENT IN AN ORGANIZED HEALTH CARE EDUCATION/TRAINING PROGRAM

## 2021-10-27 PROCEDURE — 99999 PR PBB SHADOW E&M-EST. PATIENT-LVL III: ICD-10-PCS | Mod: PBBFAC,,, | Performed by: STUDENT IN AN ORGANIZED HEALTH CARE EDUCATION/TRAINING PROGRAM

## 2021-10-27 PROCEDURE — 99213 PR OFFICE/OUTPT VISIT, EST, LEVL III, 20-29 MIN: ICD-10-PCS | Mod: S$GLB,,, | Performed by: STUDENT IN AN ORGANIZED HEALTH CARE EDUCATION/TRAINING PROGRAM

## 2021-10-27 PROCEDURE — 3078F DIAST BP <80 MM HG: CPT | Mod: CPTII,S$GLB,, | Performed by: STUDENT IN AN ORGANIZED HEALTH CARE EDUCATION/TRAINING PROGRAM

## 2021-10-27 PROCEDURE — 3044F PR MOST RECENT HEMOGLOBIN A1C LEVEL <7.0%: ICD-10-PCS | Mod: CPTII,S$GLB,, | Performed by: STUDENT IN AN ORGANIZED HEALTH CARE EDUCATION/TRAINING PROGRAM

## 2021-10-27 PROCEDURE — 3008F BODY MASS INDEX DOCD: CPT | Mod: CPTII,S$GLB,, | Performed by: STUDENT IN AN ORGANIZED HEALTH CARE EDUCATION/TRAINING PROGRAM

## 2021-10-27 PROCEDURE — 1159F PR MEDICATION LIST DOCUMENTED IN MEDICAL RECORD: ICD-10-PCS | Mod: CPTII,S$GLB,, | Performed by: STUDENT IN AN ORGANIZED HEALTH CARE EDUCATION/TRAINING PROGRAM

## 2021-12-15 ENCOUNTER — OFFICE VISIT (OUTPATIENT)
Dept: SPORTS MEDICINE | Facility: CLINIC | Age: 26
End: 2021-12-15
Payer: COMMERCIAL

## 2021-12-15 VITALS
WEIGHT: 178 LBS | HEIGHT: 63 IN | BODY MASS INDEX: 31.54 KG/M2 | DIASTOLIC BLOOD PRESSURE: 73 MMHG | HEART RATE: 76 BPM | SYSTOLIC BLOOD PRESSURE: 119 MMHG

## 2021-12-15 DIAGNOSIS — G89.29 CHRONIC RIGHT SHOULDER PAIN: Primary | ICD-10-CM

## 2021-12-15 DIAGNOSIS — M25.511 CHRONIC RIGHT SHOULDER PAIN: Primary | ICD-10-CM

## 2021-12-15 PROCEDURE — 99999 PR PBB SHADOW E&M-EST. PATIENT-LVL III: CPT | Mod: PBBFAC,,, | Performed by: ORTHOPAEDIC SURGERY

## 2021-12-15 PROCEDURE — 99203 PR OFFICE/OUTPT VISIT, NEW, LEVL III, 30-44 MIN: ICD-10-PCS | Mod: S$GLB,,, | Performed by: ORTHOPAEDIC SURGERY

## 2021-12-15 PROCEDURE — 99203 OFFICE O/P NEW LOW 30 MIN: CPT | Mod: S$GLB,,, | Performed by: ORTHOPAEDIC SURGERY

## 2021-12-15 PROCEDURE — 99999 PR PBB SHADOW E&M-EST. PATIENT-LVL III: ICD-10-PCS | Mod: PBBFAC,,, | Performed by: ORTHOPAEDIC SURGERY

## 2021-12-18 ENCOUNTER — HOSPITAL ENCOUNTER (OUTPATIENT)
Dept: RADIOLOGY | Facility: HOSPITAL | Age: 26
Discharge: HOME | End: 2021-12-18
Attending: ORTHOPAEDIC SURGERY
Payer: COMMERCIAL

## 2021-12-18 DIAGNOSIS — M25.511 CHRONIC RIGHT SHOULDER PAIN: ICD-10-CM

## 2021-12-18 DIAGNOSIS — G89.29 CHRONIC RIGHT SHOULDER PAIN: ICD-10-CM

## 2021-12-18 PROCEDURE — 73221 MRI JOINT UPR EXTREM W/O DYE: CPT | Mod: 26,RT,, | Performed by: RADIOLOGY

## 2021-12-18 PROCEDURE — 73221 MRI JOINT UPR EXTREM W/O DYE: CPT | Mod: TC,RT

## 2021-12-18 PROCEDURE — 73221 MRI SHOULDER WITHOUT CONTRAST RIGHT: ICD-10-PCS | Mod: 26,RT,, | Performed by: RADIOLOGY

## 2021-12-20 ENCOUNTER — TELEPHONE (OUTPATIENT)
Dept: SPORTS MEDICINE | Facility: CLINIC | Age: 26
End: 2021-12-20
Payer: COMMERCIAL

## 2021-12-20 DIAGNOSIS — M75.111 NONTRAUMATIC INCOMPLETE TEAR OF RIGHT ROTATOR CUFF: Primary | ICD-10-CM

## 2021-12-22 ENCOUNTER — OFFICE VISIT (OUTPATIENT)
Dept: OTOLARYNGOLOGY | Facility: CLINIC | Age: 26
End: 2021-12-22
Payer: COMMERCIAL

## 2021-12-22 DIAGNOSIS — R49.0 DYSPHONIA: Primary | ICD-10-CM

## 2021-12-22 PROCEDURE — 1159F PR MEDICATION LIST DOCUMENTED IN MEDICAL RECORD: ICD-10-PCS | Mod: CPTII,95,, | Performed by: OTOLARYNGOLOGY

## 2021-12-22 PROCEDURE — 99499 UNLISTED E&M SERVICE: CPT | Mod: 95,,, | Performed by: OTOLARYNGOLOGY

## 2021-12-22 PROCEDURE — 3044F HG A1C LEVEL LT 7.0%: CPT | Mod: CPTII,95,, | Performed by: OTOLARYNGOLOGY

## 2021-12-22 PROCEDURE — 1160F RVW MEDS BY RX/DR IN RCRD: CPT | Mod: CPTII,95,, | Performed by: OTOLARYNGOLOGY

## 2021-12-22 PROCEDURE — 1159F MED LIST DOCD IN RCRD: CPT | Mod: CPTII,95,, | Performed by: OTOLARYNGOLOGY

## 2021-12-22 PROCEDURE — 3044F PR MOST RECENT HEMOGLOBIN A1C LEVEL <7.0%: ICD-10-PCS | Mod: CPTII,95,, | Performed by: OTOLARYNGOLOGY

## 2021-12-22 PROCEDURE — 99499 NO LOS: ICD-10-PCS | Mod: 95,,, | Performed by: OTOLARYNGOLOGY

## 2021-12-22 PROCEDURE — 1160F PR REVIEW ALL MEDS BY PRESCRIBER/CLIN PHARMACIST DOCUMENTED: ICD-10-PCS | Mod: CPTII,95,, | Performed by: OTOLARYNGOLOGY

## 2021-12-27 ENCOUNTER — PATIENT MESSAGE (OUTPATIENT)
Dept: SPEECH THERAPY | Facility: HOSPITAL | Age: 26
End: 2021-12-27
Payer: COMMERCIAL

## 2022-01-10 ENCOUNTER — CLINICAL SUPPORT (OUTPATIENT)
Dept: SPEECH THERAPY | Facility: HOSPITAL | Age: 27
End: 2022-01-10
Attending: OTOLARYNGOLOGY
Payer: COMMERCIAL

## 2022-01-10 DIAGNOSIS — R49.0 MUSCLE TENSION DYSPHONIA: ICD-10-CM

## 2022-01-10 DIAGNOSIS — R49.1 VOICE ABSENCE: ICD-10-CM

## 2022-01-10 DIAGNOSIS — R49.0 DYSPHONIA: Primary | ICD-10-CM

## 2022-01-10 DIAGNOSIS — R49.9 VOICE COMPLAINT: ICD-10-CM

## 2022-01-10 NOTE — PROGRESS NOTES
"Referring provider: Dr. Colin Wyman  Reason for visit:  Behavioral and qualitative analysis of voice and resonance (CPT 83988)    Subjective/History   Nae Escalante is a 26 y.o. female referred for voice evaluation (CPT 46836) by Dr. Wyman. She characterizes hoarseness that began a few months ago and has gotten progressively worse. She characterizes fatigue with use. When speaking, she "gets choked up," and states "my voice goes in and out and sometimes I lose it completely and it goes to a whisper." She struggles to be heard. Family members, friends and colleagues have commented on her voice changes.She describes an increased vocal-load with two jobs and few days off. Alleviating factors include: voice rest, continuous sips of water. Exacerbating factors include: difficulty projecting in noisy environments.  She also endorses frequent throat-clearing. 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     Swallowing: no  Breathing: WNL    Smoking: no  Caffeine: no  Reflux: unsure  Water: plenty    Laryngeal endoscopy or stroboscopy findings to be obtained on 1/18/2022.    No past medical history on file.  Current Outpatient Medications on File Prior to Visit   Medication Sig Dispense Refill    ergocalciferol (ERGOCALCIFEROL) 50,000 unit Cap Take 1 capsule (50,000 Units total) by mouth every 7 days. 12 capsule 0     No current facility-administered medications on file prior to visit.       Objective   Perceptual assessment:    -CAPE-V Overall Score: 19  -Quality: she describes internittent breathiness  -Volume: decreased projection  -Pitch: appropriate for age and gender  -Flexibility: appropriate for age and gender  -Habitual respiratory pattern: chest/clavicular.      The Reflux Symptom Index (RSI)   Within the last MONTH, how did the following problem affect you?      0 = No problem to  5 = Severe problem   1.  Hoarseness or a problem with your voice " 0 1 2 3 4 5   2.  Clearing your throat 0 1 2 3 4 5   3.  Excess throat mucous or postnasal drip 0 1 2 3 4 5   4.  Difficulty swallowing food, liquids, or pills 0 1 2 3 4 5   5.  Coughing after you ate or after lying down 0 1 2 3 4 5   6.  Breathing difficulties or choking episodes 0 1 2 3 4 5   7.  Troublesome or annoying cough 0 1 2 3 4 5    8.  Sensations of something sticking in your throat or a lump in your throat 0 1 2 3 4 5   9.  Heartburn, chest pain, indigestion, or stomach acid coming up 0 1 2 3 4 5 TOTAL  Score: 20  If you have an RSI score of 15 or greater, you have a 90% chance of having reflux, even if you have never had heartburn or indigestion. This is called silent reflux, and it's very common.  Darlene PC, Laurie GN, and Davina JA.  Validity and reliability of the reflux symptom index (RSI).  Journal of Voice.   2002.  16(2): 274-277.       Education / Stimulability Trials  Client education provided on the importance of laryngeal imaging, Dr. Wyman's impressions and recommendations, the patient's differential diagnosis, associated symptoms and rationale for voice therapy as a behavioral approach for management of dysphonia. Discussed importance of vocal hygiene including: hydration, conservation, reducing caffeine/drying agents and reducing throat clearing, coughing, other phonotraumatic behaviors. Patient was stimulable for improved voice using SOVT exercises with straw phonation. Needs cue for forward resonance. Encouraged practicing exercises several times daily in isolation and into short phrases to solidify muscle memory patterns and reduce extralaryngeal tension during speech tasks.  She was amenable to all suggestions.     Functional goals  Length Status Goal   Long term Initiated  Patient will implement and adhere to vocal hygiene protocols on a daily basis, including the elimination of phonotraumatic behaviors.    Long term Initiated  Patient and clinician will facilitate changes in  vocal function in order to restore functional use of voice for daily occupational, social, and emotional demands.    Long term Initiated  Patient will re-establish phonation with adequate balance of airflow and resonance with decreased muscle tension.    Long term Initiated   Patient will improve coordination of respiration and phonation for efficient vocal production at a conversational level.    Short term Initiated  Patient will complete SOVT exercises and/or resonant-focused exercises 3-5x daily to strengthen and balance the intrinsic laryngeal musculature and maximize glottic closure without medial hyperfunction.   Short term Initiated  Patient will improve the quality, efficiency, and ease of phonation through resonant and/or airflow exercises from the syllable to conversation level with 80% accuracy.   Short term Initiated  Patient will discriminate between easy and strained phonation with 80% accuracy.    Short term Initiated  Patient will demonstrate the ability to increase awareness of voicing behavior through self-monitoring to facilitate generalization in functional speaking situations with 80% accuracy.        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 Wyman.  Prognosis for continued improvement is good.     Recommendations / POC    -Recommend 2-4 sessions of voice therapy over 4-6 weeks with a speech-language pathologist to optimize glottal postures for improved vocal function, vocal efficiency, and ease of phonation  -Recommend laryngeal stroboscopy exam  -Continue exercises as discussed in session  -Contact clinician with any further questions

## 2022-01-14 ENCOUNTER — CLINICAL SUPPORT (OUTPATIENT)
Dept: REHABILITATION | Facility: HOSPITAL | Age: 27
End: 2022-01-14
Payer: COMMERCIAL

## 2022-01-14 DIAGNOSIS — G89.29 CHRONIC RIGHT SHOULDER PAIN: ICD-10-CM

## 2022-01-14 DIAGNOSIS — R53.1 DECREASED STRENGTH: ICD-10-CM

## 2022-01-14 DIAGNOSIS — M75.111 NONTRAUMATIC INCOMPLETE TEAR OF RIGHT ROTATOR CUFF: ICD-10-CM

## 2022-01-14 DIAGNOSIS — M25.511 CHRONIC RIGHT SHOULDER PAIN: ICD-10-CM

## 2022-01-14 DIAGNOSIS — M25.611 DECREASED ROM OF RIGHT SHOULDER: ICD-10-CM

## 2022-01-14 PROCEDURE — 97110 THERAPEUTIC EXERCISES: CPT | Mod: PN

## 2022-01-14 PROCEDURE — 97161 PT EVAL LOW COMPLEX 20 MIN: CPT | Mod: PN

## 2022-01-14 NOTE — PLAN OF CARE
[facility]  Physical Therapy Initial Evaluation    Name: Nae Escalante  Clinic Number: [id number]    Therapy Diagnosis:   Encounter Diagnoses   Name Primary?    Nontraumatic incomplete tear of right rotator cuff     Chronic right shoulder pain     Decreased ROM of right shoulder     Decreased strength      Physician: Paul Ventura MD    Physician Orders: PT Eval and Treat  Medical Diagnosis from Referral: M75.111 (ICD-10-CM) - Nontraumatic incomplete tear of right rotator cuff  Evaluation Date: 1/14/2022  Authorization Period Expiration: 12/31/2022  Plan of Care Expiration: 3/11/2022  Visit # / Visits authorized: 1/60  FOTO: 1/5  PTA Visit: 0/6    Time In: 7:15 AM  Time Out: 8:00 AM  Total Billable Time: 45 minutes (1 TE + 1 LCE)    Precautions: Standard    Subjective   Date of onset: 12/2020  History of current condition - Nae reports: history of off and on R shoulder pain from 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 Reports the pain came on insidiously this time about 2-3 months ago, and greatly worsened after doing bench press among other activities at the gym last December. Her pain affects her sleep quality, right arm mobility/strength, and pain is constant throughout the day. She reports two types of pains one being at the david-lateral aspect of her right upper arm and the second from anterior to posterior aspect of her shoulder. Denies any numbness or tingling at this time, but pain will go down her arm near her elbow area at times. 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 She received therapy for 4-6 weeks early 2021 that improved her pain/mobility, and only reported having some neck pain after that. Her neck pain would go down her arm into right pinky finger and did have some infrequent numbness in tingling in  the same distribution.  She has been active in gymnastics since she was 8 years old and been active on a collegiate level up until 2015 (her sophomore level). She transitioned to staff mainly due to right knee pain but did have right shoulder issues as far back in early college. Sleeping is better recently, but does have disturbed sleep if she sleeps on her right side. No longer wearing the sling and occasionally has flare ups still that she thinks is from her coaching jobs.      Medical History:   No past medical history on file.    Surgical History:   Nae Escalante  has no past surgical history on file.    Medications:   Nae has a current medication list which includes the following prescription(s): ergocalciferol.    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  Prior Level of Function: chronic right shoulder pain but full right shoulder AROM, R sided neck pain  Current Level of Function limitation: pain and difficulty with pushing/pulling and chest/overhead level R shoulder use  Pts goals: to reduce my shoulder pain and raise my arm without difficulty     Pain:  Current 4/10, worst 9/10, best 3/10   Location: upper david-lateral shoulder  Description: Aching and Sharp  Aggravating Factors: see current level of function  Easing Factors: pain medication - Ibuprofen PRN;  icing    Objective     Posture: anteriorly glided R humeral head, protracted R scapula, slight forward head, broad shoulders  Sensation: WNL at this time  Palpation: +TTP at long head biceps origin and distally, anterior and posterior aspect of right GHJ, supraspinatus insertion  Resting scapular assessment: abducted and depressed R>L scapula  Handedness: R    * = R shoulder pain with testing  NT = Not  tested  AROM: CERVICAL   Flexion 100%   Extension 100%   Right side bending 100%   Left side bending 100%, some slight R upper trap discomfort   Right rotation 10%   Left rotation 100%, some slight right upper trap and shoulder discomfort     Shoulder  Right   Left  Pain/Dysfunction with Movement    AROM PROM MMT AROM PROM MMT    Upper trap NT NT NT WFL WFL 5/5    Middle trap NT NT NT WFL WFL NT    Lower trap NT NT NT WFL WFL NT    Serratus anterior NT NT NT WFL WFL NT    Flexion (180 deg) 80* 160* NT WFL WFL 4+/5    Extension (50 deg) 40* NT NT WFL WFL 5/5    Abduction (180 deg) 110* 180* NT WFL WFL 4/5    Horizontal Adduction (30-45 deg) NT NT NT WFL WFL NT    IR (80 deg) 46 50* 4/5 WFL WFL 5/5    ER at 90° abd (90 deg) NT NT NT NT NT NT    ER at 0° abd  (90 deg) 60* 65* 3+/5* WFL WFL 4/5      Shoulder Tests:  Shoulder capsular pattern (for frozen shoulder): ER>Abd>IR limited  AC joint Tests:  - AC Compression Test = not tested  - Cross-Body Adduction Test: positive on R  - Resisted Extension Test: positive on R  Labral tears:  - Hubbardston's Test = positive on R for set-up and worse for provocation position  Anterior instability:  - Apprehension test = positive on R  - Relocation test (Garfield subluxation test) = positive on R  Inferior instability:  - Sulcus sign = negative, slight laxity compared to L  Subacromial impingement test cluster: (+3/3 = +LR of 10.56, +2/3 = +LR of 5.03, 0/3 = -LR 0.17)  - Painful arc sign (pain at  deg range) = positive on R  - Infraspinatus Test = positive on R  - Sharma Gareth Test = positive on R  Full thickness rotator cuff tear test cluster: (+3/3 = +LR 15.6, +2/3 = +LR 3.6, 0/0 = -LR 0.16)  - Painful arc sign = positive on R  - Drop arm test = positive on R  - Infraspinatus test = positive on R  Other:  - Neer's Test (rotator cuff pathology)= positive on R at end range flexion  - Speed's Test (long head of biceps, rotator cuff pathology) = positive on R (mild)  - Empty Can  Test (supraspinatus, rotator cuff pathology) = positive on R (mild)  - Subscapularis Lift off Sign (rotator cuff pathology) = not tested due to pain  - Shoulder glide test = laxity with anterior gliding    Cervical Tests:   Spurling's test = not tested  Maximal Cervical Compression Test = not tested  Cervical Distraction Test = not tested  Nerve ULTT: not tested    CMS Impairment/Limitation/Restriction for FOTO Shoulder Survey    Therapist reviewed FOTO scores for Nae Escalante on 1/14/2022.   FOTO documents entered into [employer] - see Media section.    Limitation Score: 50%  Category: Carrying  Predicted: 27%     TREATMENT   Treatment Time In: 7:50 AM  Treatment Time Out: 8:00 AM  Total Treatment time separate from Evaluation: 10 minutes    Nae received therapeutic exercises to develop strength, endurance, ROM, flexibility and posture for 10 minutes including:    Proprioceptive R shoulder GHJ positioning   Shoulder isometrics for ER/abduction: 5x6'' holds each, R  Table slides: 5x R  Wall slides: 5x R    Home Exercises and Patient Education Provided  Education provided:   - proper lifting and carrying mechanics  - avoiding sharp concordant pain in mobility  - course of therapy, prognosis  - importance of HEP    Written Home Exercises Provided: yes.  Exercises were reviewed and Nae was able to demonstrate them prior to the end of the session.  Nae demonstrated good  understanding of the education provided.     See EMR under Media for exercises provided 1/14/2022.    Assessment   Nae is a 26 y.o. female referred to outpatient Physical Therapy with a medical diagnosis of Nontraumatic incomplete tear of right rotator cuff presenting to PT at [facility]. Pt currently presents with R shoulder pain, decreased R shoulder-girdle strength, impaired posture, impaired scapulohumeral rhythm, and functional deficits with chest and overhead level shoulder movements. Pt would benefit from  skilled PT consisting of muscular skeletal stretching/strengthening, manual therapy, neuro muscular re-education, and modalities prn to address limitations and increase functional mobility.    Pt prognosis is Excellent.   Pt will benefit from skilled outpatient Physical Therapy to address the deficits stated above and in the chart below, provide pt/family education, and to maximize pt's level of independence.     Plan of care discussed with patient: Yes  Pt's spiritual, cultural and educational needs considered and patient is agreeable to the plan of care and goals as stated below:     Anticipated Barriers for therapy: work conditions(2 gymnastic coaching jobs)    Medical Necessity is demonstrated by the following  History  Co-morbidities and personal factors that may impact the plan of care Co-morbidities:   None    Personal Factors:   no deficits     low   Examination  Body Structures and Functions, activity limitations and participation restrictions that may impact the plan of care Body Regions:   head  neck  back  upper extremities  trunk    Body Systems:    gross symmetry  ROM  strength  gross coordinated movement  balance  gait  transfers  transitions  motor control    Participation Restrictions:   gymnastics    Activity limitations:   Learning and applying knowledge  no deficits    General Tasks and Commands  no deficits    Communication  no deficits    Mobility  lifting and carrying objects  driving (bike, car, motorcycle)    Self care  caring for body parts (brushing teeth, shaving, grooming)    Domestic Life  doing house work (cleaning house, washing dishes, laundry)    Interactions/Relationships  no deficits    Life Areas  no deficits    Community and Social Life  no deficits         high   Clinical Presentation stable and uncomplicated low   Decision Making/ Complexity Score: low     GOALS: Short Term Goals: 4 weeks  1. Report decreased R shoulder pain </= 2/10 at rest and during low grade shoulder  activito increase tolerance for ADLs and increased QoL.  2. Increase R shoulder PROM by 20 degrees in flexion and external rotation for increased functional mobility.  3. Increased strength by 1/3 MMT grade in R shoulder to increase tolerance for ADL and work activities.  4. Pt to tolerate HEP to improve ROM and independence with ADL's.    Long Term Goals: 8 weeks  1. Report decreased R shoulder pain </= 1/10 for overhead activities to increase tolerance for ADLs and increased QoL.  2. Increase R shoulder AROM to 180 degrees of active abduction and flexion for increased functional mobility and independent ADLs with decreased pain.  3. Increase strength to >/= 4+/5 in BUE to increase tolerance for ADL and work activities.  4. Pt goal: to reduce my shoulder pain and raise my arm without difficulty   5. Pt will have improved to score of </= 27% limited on shoulder FOTO in order to demonstrate true functional improvement.  6. Pt will tests negative and/or very mild positive testing for R shoulder rotator cuff and labral special tests.     Plan   Plan of care Certification: 1/14/2022 to 3/11/2022.    Outpatient Physical Therapy 2 times weekly for 8 weeks to include the following interventions: Aquatic Therapy, Cervical/Lumbar Traction, Electrical Stimulation, Gait Training, Manual Therapy, Moist Heat/ Ice, Neuromuscular Re-ed, Orthotic Management and Training, Patient Education, Self Care, Therapeutic Activites and Therapeutic Exercise.     Baldo Nicole, PT

## 2022-01-18 ENCOUNTER — CLINICAL SUPPORT (OUTPATIENT)
Dept: SPEECH THERAPY | Facility: HOSPITAL | Age: 27
End: 2022-01-18
Attending: OTOLARYNGOLOGY
Payer: COMMERCIAL

## 2022-01-18 DIAGNOSIS — R49.1 VOICE ABSENCE: ICD-10-CM

## 2022-01-18 DIAGNOSIS — R49.0 DYSPHONIA: ICD-10-CM

## 2022-01-18 DIAGNOSIS — J38.3 VOCAL CORDS SWELLING: ICD-10-CM

## 2022-01-18 DIAGNOSIS — R49.0 MUSCLE TENSION DYSPHONIA: ICD-10-CM

## 2022-01-18 DIAGNOSIS — J38.2 VOCAL NODULES IN ADULTS: ICD-10-CM

## 2022-01-18 PROCEDURE — 31579 LARYNGOSCOPY TELESCOPIC: CPT | Mod: GN

## 2022-01-18 NOTE — PROGRESS NOTES
"Referring provider: Dr. Wyman  Reason for visit:  Laryngoscopy with videostroboscopy (CPT 24069)    Subjective / History    Nae Escalante, (F. 26 y.o.) returns to the voice center for laryngeal imaging. She was initially referred on 1/10/2022 by Dr. Wyman for voice evaluation at which time she described her voice problem as follows: hoarseness; difficulty projecting and decreased access to singing range. Onset: relatively new and worsening in the past couple months. Says she has to repeat herself multiple times to be heard and sometimes her voice goes totally "out." She endorses: throat clearing. Denies: reflux. Alleviating factors include: voice rest and sips of water. Exacerbating factors: speaking over background noise. She characterizes a significant daily vocal load with 8-12 hours of voice use per day. 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 Needs access to safe-projection for gymnastics. Sunday is a typical vocal rest day.     No past medical history on file.  Current Outpatient Medications on File Prior to Visit   Medication Sig Dispense Refill    ergocalciferol (ERGOCALCIFEROL) 50,000 unit Cap Take 1 capsule (50,000 Units total) by mouth every 7 days. 12 capsule 0     No current facility-administered medications on file prior to visit.       Objective   Perceptual assessment:    -CAPE-V Overall Score: 19  -Quality: she describes internittent breathiness  -Volume: decreased projection  -Pitch: appropriate for age and gender  -Flexibility: appropriate for age and gender  -Habitual respiratory pattern: chest/clavicular.    The Reflux Symptom Index (RSI) 10/1/2022  Within the last MONTH, how did the following problem affect you?   0 = No problem to  5 = Severe problem   1.  Hoarseness or a problem with your voice 0 1 2 3 4 5   2.  Clearing your throat 0 1 2 3 4 5 "   3.  Excess throat mucous or postnasal drip 0 1 2 3 4 5   4.  Difficulty swallowing food, liquids, or pills 0 1 2 3 4 5   5.  Coughing after you ate or after lying down 0 1 2 3 4 5   6.  Breathing difficulties or choking episodes 0 1 2 3 4 5   7.  Troublesome or annoying cough 0 1 2 3 4 5    8.  Sensations of something sticking in your throat or a lump in your throat 0 1 2 3 4 5   9.  Heartburn, chest pain, indigestion, or stomach acid coming up 0 1 2 3 4 5 TOTAL  Score: 20  If you have an RSI score of 15 or greater, you have a 90% chance of having reflux, even if you have never had heartburn or indigestion. This is called silent reflux, and it's very common.  Darlene PC, Laurie GN, and Davina JA.  Validity and reliability of the reflux symptom index (RSI).  Journal of Voice.   2002.  16(2): 274-277.     Rigid Laryngeal Videostroboscopy (54648): Laryngeal videostroboscopy is indicated to assess the laryngeal vibratory biomechanics and vocal fold oscillation, which cannot be assessed with a plain light examination. This was carried out with a 70 degree endoscope. After verbal consent was obtained, the patient was positioned and the tongue was gently secured with a gauze sponge. The endoscope was passed transorally and positioned to image the larynx and hypopharynx in detail. The following features were examined: laryngeal and hypopharyngeal masses; vocal fold range and symmetry of motion; laryngeal mucosal edema, erythema, inflammation, and hydration; salivary pooling; and gross laryngeal sensation. During phonation, the vocal folds were assessed for glottal closure; mucosal wave; vocal fold lesions; vibratory periodicity, amplitude, and phase symmetry; and vertical height match. The equipment was removed. The patient tolerated the procedure well without complication. All findings were normal except:  1. B TVF fully ab/adduct  2. B TVF mid fold swelling  3. B TVF amplitude WNL, B TVF wave motion at times  asymmetric  4.  Hourglass glottal configuration resulting in glottic insufficiency at times                  Impressions/Plan:  Nae Escalante is a well developed, well nourished female who presents in no acute distress. Affect and social interaction were found to be WNL. Patient education provided on the findings from laryngeal imaging as they pertain to the anatomy and physiology of voice production, the patient's reported symptoms and plan of care for further management of dysphonia. Discussed importance of vocal hygiene including: hydration, conservation, reducing caffeine/drying agents and reducing throat clearing, coughing, other phonotraumatic behaviors. Patient was encouraged to continue daily home exercise program: SOVT exercises with straw phonation, practicing exercises several times daily in isolation and into short phrases to solidify muscle memory patterns and reduce extralaryngeal tension during speech tasks. She was given a list of OTC lozenges per request. She was encouraged to continue steam, gargle and practice voice conservation when possible.  She was amenable to all suggestions.       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 Wyman.  Prognosis for continued improvement is good.     Recommendations / POC    -Recommend 2-4 sessions of voice therapy over 4-6 weeks with a speech-language pathologist to optimize glottal postures for improved vocal function, vocal efficiency, and ease of phonation  -Continue voice exercises as discussed and trained in prior session  -Contact clinician with any further questions

## 2022-01-21 PROBLEM — R53.1 DECREASED STRENGTH: Status: ACTIVE | Noted: 2022-01-21

## 2022-01-21 PROBLEM — M25.511 CHRONIC RIGHT SHOULDER PAIN: Status: ACTIVE | Noted: 2022-01-21

## 2022-01-21 PROBLEM — M25.611 DECREASED ROM OF RIGHT SHOULDER: Status: ACTIVE | Noted: 2022-01-21

## 2022-01-21 PROBLEM — G89.29 CHRONIC RIGHT SHOULDER PAIN: Status: ACTIVE | Noted: 2022-01-21

## 2022-01-21 PROBLEM — M75.111 NONTRAUMATIC INCOMPLETE TEAR OF RIGHT ROTATOR CUFF: Status: ACTIVE | Noted: 2022-01-21

## 2022-01-27 ENCOUNTER — HOSPITAL ENCOUNTER (EMERGENCY)
Facility: HOSPITAL | Age: 27
Discharge: HOME | End: 2022-01-28
Attending: EMERGENCY MEDICINE
Payer: COMMERCIAL

## 2022-01-27 DIAGNOSIS — R06.02 SHORTNESS OF BREATH: ICD-10-CM

## 2022-01-27 DIAGNOSIS — R09.1 PLEURISY: Primary | ICD-10-CM

## 2022-01-27 LAB
B-HCG UR QL: NEGATIVE
CTP QC/QA: YES
POC MOLECULAR INFLUENZA A AGN: NEGATIVE
POC MOLECULAR INFLUENZA B AGN: NEGATIVE
SARS-COV-2 RDRP RESP QL NAA+PROBE: NEGATIVE

## 2022-01-27 PROCEDURE — 81025 URINE PREGNANCY TEST: CPT | Performed by: EMERGENCY MEDICINE

## 2022-01-27 PROCEDURE — 25000003 PHARM REV CODE 250: Performed by: EMERGENCY MEDICINE

## 2022-01-27 PROCEDURE — 93005 ELECTROCARDIOGRAM TRACING: CPT

## 2022-01-27 PROCEDURE — 99284 EMERGENCY DEPT VISIT MOD MDM: CPT | Mod: 25

## 2022-01-27 PROCEDURE — 93010 ELECTROCARDIOGRAM REPORT: CPT | Mod: ,,, | Performed by: INTERNAL MEDICINE

## 2022-01-27 PROCEDURE — 93010 EKG 12-LEAD: ICD-10-PCS | Mod: ,,, | Performed by: INTERNAL MEDICINE

## 2022-01-27 PROCEDURE — U0002 COVID-19 LAB TEST NON-CDC: HCPCS | Performed by: EMERGENCY MEDICINE

## 2022-01-27 RX ORDER — KETOROLAC TROMETHAMINE 10 MG/1
10 TABLET, FILM COATED ORAL EVERY 6 HOURS PRN
Qty: 20 TABLET | Refills: 0 | Status: SHIPPED | OUTPATIENT
Start: 2022-01-27 | End: 2022-02-01

## 2022-01-27 RX ORDER — IBUPROFEN 600 MG/1
600 TABLET ORAL
Status: COMPLETED | OUTPATIENT
Start: 2022-01-27 | End: 2022-01-27

## 2022-01-27 RX ADMIN — IBUPROFEN 600 MG: 600 TABLET ORAL at 10:01

## 2022-01-27 NOTE — Clinical Note
"Nae"Rylee Escalante was seen and treated in our emergency department on 1/27/2022.  She may return to work on 01/29/2022.       If you have any questions or concerns, please don't hesitate to call.      Garcia Gage MD"

## 2022-01-28 VITALS
HEIGHT: 63 IN | RESPIRATION RATE: 16 BRPM | HEART RATE: 86 BPM | TEMPERATURE: 99 F | WEIGHT: 170 LBS | OXYGEN SATURATION: 100 % | DIASTOLIC BLOOD PRESSURE: 73 MMHG | SYSTOLIC BLOOD PRESSURE: 129 MMHG | BODY MASS INDEX: 30.12 KG/M2

## 2022-01-28 NOTE — ED NOTES
Pt c/o SOBw/ chest soreness when she takes a deep breath, headache, chills, stuffy nose, body ache, post- nasal gtt x 2 days. Pt is A & O x 3, denies fever and N/V/D. No obvious respiratory distress noted. Respirations are even and unlabored. Skin is warm and dry w/ pink mucosa. VS. JOSR x 3mm. BBS- CTA . Abd- SNT. PSM x 4 exts. Bed is locked, in the low position and locked for safety. Call bell @ BS. Will continue to monitor closely.   none

## 2022-01-28 NOTE — ED PROVIDER NOTES
Encounter Date: 1/27/2022       History     Chief Complaint   Patient presents with    URI     Patient presents to the ED with reports of having congestion, body aches, and chest soreness.      Pleasant 26-year-old female, denies medical history or allergies, presents the ER for evaluation of myalgia cough congestion chest pain shortness of breath.  Onset yesterday progressively worsening.  Is COVID vaccinated.  Has been taking multivitamins on with minimal improvement.  Never had symptoms like this before, concerned about chest pain shortness of breath no cardiac history, came to the ER for further evaluation.        Review of patient's allergies indicates:  No Known Allergies  No past medical history on file.  No past surgical history on file.  Family History   Problem Relation Age of Onset    Diabetes Father     Prostate cancer Father     Breast cancer Maternal Aunt      Social History     Tobacco Use    Smoking status: Never Smoker    Smokeless tobacco: Never Used   Substance Use Topics    Alcohol use: Never    Drug use: Never     Review of Systems   Constitutional: Positive for fatigue and fever.   Respiratory: Positive for shortness of breath.    Cardiovascular: Positive for chest pain.   Gastrointestinal: Negative for abdominal pain.   Musculoskeletal: Positive for myalgias.   All other systems reviewed and are negative.      Physical Exam     Initial Vitals [01/27/22 2207]   BP Pulse Resp Temp SpO2   130/72 84 16 99.2 °F (37.3 °C) 100 %      MAP       --         Physical Exam    Nursing note and vitals reviewed.  Constitutional: She appears well-developed.   HENT:   Head: Normocephalic and atraumatic.   Eyes: Pupils are equal, round, and reactive to light.   Neck:   Normal range of motion.  Cardiovascular: Normal rate, regular rhythm and normal heart sounds.   Pulmonary/Chest: Breath sounds normal. No respiratory distress.   Reproducible chest wall tenderness   Abdominal: Abdomen is soft. She  exhibits no distension. There is no abdominal tenderness.   Musculoskeletal:         General: Normal range of motion.      Cervical back: Normal range of motion.     Neurological: She is alert and oriented to person, place, and time. She has normal strength. GCS score is 15. GCS eye subscore is 4. GCS verbal subscore is 5. GCS motor subscore is 6.   Skin: Skin is warm and dry. Capillary refill takes less than 2 seconds.   Psychiatric: She has a normal mood and affect. Thought content normal.         ED Course   Procedures  Labs Reviewed   POCT URINE PREGNANCY - Normal   SARS-COV-2 RDRP GENE    Narrative:     This test utilizes isothermal nucleic acid amplification   technology to detect the SARS-CoV-2 RdRp nucleic acid segment.   The analytical sensitivity (limit of detection) is 125 genome   equivalents/mL.   A POSITIVE result implies infection with the SARS-CoV-2 virus;   the patient is presumed to be contagious.     A NEGATIVE result means that SARS-CoV-2 nucleic acids are not   present above the limit of detection. A NEGATIVE result should be   treated as presumptive. It does not rule out the possibility of   COVID-19 and should not be the sole basis for treatment decisions.   If COVID-19 is strongly suspected based on clinical and exposure   history, re-testing using an alternate molecular assay should be   considered.   This test is only for use under the Food and Drug   Administration s Emergency Use Authorization (EUA).   Commercial kits are provided by [employer].   Performance characteristics of the EUA have been independently   verified by [facility] Department of   Pathology and Laboratory Medicine.   _________________________________________________________________   The authorized Fact Sheet for Healthcare Providers and the authorized Fact   Sheet for Patients of the ID NOW COVID-19 are available on the FDA   website:      https://www.fda.gov/media/335466/download  https://www.fda.gov/media/682378/download       POCT INFLUENZA A/B MOLECULAR          Imaging Results          X-Ray Chest PA And Lateral (Final result)  Result time 01/27/22 23:38:27    Final result by Tony Garsia DO (01/27/22 23:38:27)                 Impression:      No acute cardiopulmonary abnormality.      Electronically signed by: Tony Garsia  Date:    01/27/2022  Time:    23:38             Narrative:    EXAMINATION:  XR CHEST PA AND LATERAL    CLINICAL HISTORY:  Shortness of breath    TECHNIQUE:  PA and lateral views of the chest were performed.    COMPARISON:  None    FINDINGS:  The lungs are well expanded and clear. No focal opacities are seen. The pleural spaces are clear.    The cardiac silhouette is unremarkable.    The visualized osseous structures are unremarkable.                                 Medications   ibuprofen tablet 600 mg (600 mg Oral Given 1/27/22 2251)     Medical Decision Making:   Initial Assessment:   Pleasant 26-year-old female presents the ER for evaluation upper respiratory symptoms with chest pain shortness of breath.  Differential includes COVID, flu, pneumonia arrhythmia costochondritis pleurisy.  Will plan x-ray EKG symptomatic support reassess.    Additional MDM:   PERC Rule:   Age is greater than or equal to 50 = 0.0  Heart Rate is greater than or equal to 100 = 0.0  SaO2 on room air < 95% = 0.0  Unilateral leg swelling = 0.0  Hemoptysis = 0.0  Recent surgery or trauma = 0.0  Prior PE or DVT =  0.0  Hormone use = 0.00  PERC Score = 0          ED Course as of 01/28/22 0552   Thu Jan 27, 2022 2251 EKG normal sinus rhythm 88 beats per minute normal axis no STEMI [SE]   Fri Jan 28, 2022   0004 Resting comfortably in bed, no acute distress, COVID flu negative x-ray EKG within normal limits.  Discussed with patient.  Likely pleurisy.  Will suspicion for significant illness at this time.  Discussed with patient at length plan  to discharge home work note NSAIDs return precautions primary care follow-up.  Patient understood agreed with plan patient will be discharged. [SE]      ED Course User Index  [SE] Garcia Gage MD             Clinical Impression:   Final diagnoses:  [R06.02] Shortness of breath  [R09.1] Pleurisy (Primary)          ED Disposition Condition    Discharge Stable        ED Prescriptions     Medication Sig Dispense Start Date End Date Auth. Provider    ketorolac (TORADOL) 10 mg tablet Take 1 tablet (10 mg total) by mouth every 6 (six) hours as needed for Pain. 20 tablet 1/27/2022 2/1/2022 Garcia Gage MD        Follow-up Information     Follow up With Specialties Details Why Contact Info    Donna Dudley MD Family Medicine Schedule an appointment as soon as possible for a visit  As needed [address] [address]  [phone]             Garcia Gage MD  01/28/22 0569

## 2022-02-01 VITALS
WEIGHT: 170 LBS | SYSTOLIC BLOOD PRESSURE: 169 MMHG | DIASTOLIC BLOOD PRESSURE: 72 MMHG | TEMPERATURE: 99 F | OXYGEN SATURATION: 100 % | BODY MASS INDEX: 30.11 KG/M2 | HEART RATE: 86 BPM | RESPIRATION RATE: 17 BRPM

## 2022-02-01 PROCEDURE — 87491 CHLMYD TRACH DNA AMP PROBE: CPT | Performed by: NURSE PRACTITIONER

## 2022-02-01 PROCEDURE — 81025 URINE PREGNANCY TEST: CPT | Performed by: NURSE PRACTITIONER

## 2022-02-01 PROCEDURE — 87591 N.GONORRHOEAE DNA AMP PROB: CPT | Performed by: NURSE PRACTITIONER

## 2022-02-01 PROCEDURE — 99283 EMERGENCY DEPT VISIT LOW MDM: CPT | Mod: 25

## 2022-02-02 ENCOUNTER — HOSPITAL ENCOUNTER (EMERGENCY)
Facility: HOSPITAL | Age: 27
Discharge: HOME | End: 2022-02-02
Attending: EMERGENCY MEDICINE
Payer: COMMERCIAL

## 2022-02-02 ENCOUNTER — CLINICAL SUPPORT (OUTPATIENT)
Dept: REHABILITATION | Facility: HOSPITAL | Age: 27
End: 2022-02-02
Payer: COMMERCIAL

## 2022-02-02 ENCOUNTER — CLINICAL SUPPORT (OUTPATIENT)
Dept: SPEECH THERAPY | Facility: HOSPITAL | Age: 27
End: 2022-02-02
Payer: COMMERCIAL

## 2022-02-02 DIAGNOSIS — G89.29 CHRONIC RIGHT SHOULDER PAIN: ICD-10-CM

## 2022-02-02 DIAGNOSIS — Z11.3 SCREENING FOR STD (SEXUALLY TRANSMITTED DISEASE): Primary | ICD-10-CM

## 2022-02-02 DIAGNOSIS — M25.511 CHRONIC RIGHT SHOULDER PAIN: ICD-10-CM

## 2022-02-02 DIAGNOSIS — R53.1 DECREASED STRENGTH: ICD-10-CM

## 2022-02-02 DIAGNOSIS — R49.9 VOICE COMPLAINT: ICD-10-CM

## 2022-02-02 DIAGNOSIS — M25.611 DECREASED ROM OF RIGHT SHOULDER: ICD-10-CM

## 2022-02-02 DIAGNOSIS — R49.0 MUSCLE TENSION DYSPHONIA: ICD-10-CM

## 2022-02-02 DIAGNOSIS — R49.0 DYSPHONIA: ICD-10-CM

## 2022-02-02 LAB
B-HCG UR QL: NEGATIVE
BACTERIA #/AREA URNS HPF: NORMAL /HPF
BILIRUB UR QL STRIP: NEGATIVE
C TRACH DNA SPEC QL NAA+PROBE: NOT DETECTED
CLARITY UR: ABNORMAL
COLOR UR: ABNORMAL
CTP QC/QA: YES
GLUCOSE UR QL STRIP: NEGATIVE
HGB UR QL STRIP: NEGATIVE
KETONES UR QL STRIP: NEGATIVE
LEUKOCYTE ESTERASE UR QL STRIP: NEGATIVE
MICROSCOPIC COMMENT: NORMAL
N GONORRHOEA DNA SPEC QL NAA+PROBE: NOT DETECTED
NITRITE UR QL STRIP: NEGATIVE
PH UR STRIP: 6 [PH] (ref 5–8)
PROT UR QL STRIP: NEGATIVE
SP GR UR STRIP: >1.03 (ref 1–1.03)
SQUAMOUS #/AREA URNS HPF: 2 /HPF
URN SPEC COLLECT METH UR: ABNORMAL
UROBILINOGEN UR STRIP-ACNC: NEGATIVE EU/DL
WBC #/AREA URNS HPF: 1 /HPF (ref 0–5)

## 2022-02-02 PROCEDURE — 92507 TX SP LANG VOICE COMM INDIV: CPT | Mod: GN,95

## 2022-02-02 PROCEDURE — 81000 URINALYSIS NONAUTO W/SCOPE: CPT | Performed by: EMERGENCY MEDICINE

## 2022-02-02 PROCEDURE — 97110 THERAPEUTIC EXERCISES: CPT | Mod: PN

## 2022-02-02 PROCEDURE — 97140 MANUAL THERAPY 1/> REGIONS: CPT | Mod: PN

## 2022-02-02 NOTE — PROGRESS NOTES
"[facility]   Physical Therapy Treatment Note     Name: Nae Escalante  Clinic Number: [id number]    Therapy Diagnosis:   Encounter Diagnoses   Name Primary?    Chronic right shoulder pain     Decreased ROM of right shoulder     Decreased strength      Physician: Paul Ventura MD    Visit Date: 2/2/2022  Physician Orders: PT Eval and Treat  Medical Diagnosis from Referral: M75.111 (ICD-10-CM) - Nontraumatic incomplete tear of right rotator cuff  Evaluation Date: 1/14/2022  Authorization Period Expiration: 12/31/2022  Plan of Care Expiration: 3/11/2022  Visit # / Visits authorized: 2/60  FOTO: 2/5  PTA Visit: 0/6     PTA Visit #: 0/5     Time In: 2:15  Time Out: 2:59  Total Billable Time: 44 minutes    SUBJECTIVE     Pt reports: pt has been trying to move shoulder more. Ongoing pain in anterior and lateral shoulder. She is sleeping a little better with pillow. 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 Pt recently went to ED for chest tightness with concern of COVID but is feeling better since.    She was compliant with home exercise program.  Response to previous treatment: first after eval  Functional change: ongoing    Pain: 4/10  Location: right shoulder      OBJECTIVE     Objective Measures updated at progress report unless specified.   ER 65 deg   Treatment     Nae received the treatments listed below:      therapeutic exercises to develop strength, endurance, ROM, flexibility, posture and core stabilization for 34 minutes including:  Proprioceptive R shoulder GHJ positioning with scap squeeze 10x5"   Rhythmic perturbations 2x30"  S/L ER 2x10  Shoulder isometrics for ER/abduction: 5x6'' holds each, R  Table slides: 20x R  Wall slides: 10x R  Table walks NT  Shoulder taps 10x   Quad push-plus 10x  Quad rock backs 10x   IR/ER walkouts YTB 10x ea    manual therapy techniques: Joint " mobilizations were applied to the: R shoulder for 10 minutes, including:  Shoulder PROM      Patient Education and Home Exercises     Home Exercises Provided and Patient Education Provided     Education provided:   - cont HEP    Written Home Exercises Provided: yes. Exercises were reviewed and Nae was able to demonstrate them prior to the end of the session.  Nae demonstrated good  understanding of the education provided. See EMR under Patient Instructions for exercises provided during therapy sessions    ASSESSMENT   Pt presents for first follow up visit with gradual improvement in shoulder ROM. Implemented further closed chain shoulder exercises to improve joint stability and proprioception. Exercises encouraged to perform exercises within pain free range. Pt with low muscular endurance as she fatigues with low repetitions. Occasional tactile cuing out of rounded shoulder position with exercises and resting position. Monitor for excess UT compensation, especially with shoulder abd isometric. No increased pain post tx, though pt is fatigued. Monitor response and progress as tolerated.       Nae Is progressing well towards her goals.   Pt prognosis is Excellent.     Pt will continue to benefit from skilled outpatient physical therapy to address the deficits listed in the problem list box on initial evaluation, provide pt/family education and to maximize pt's level of independence in the home and community environment.     Pt's spiritual, cultural and educational needs considered and pt agreeable to plan of care and goals.     Anticipated barriers to physical therapy: work conditions(2 gymnastic coaching jobs)    GOALS: Short Term Goals: 4 weeks  1. Report decreased R shoulder pain </= 2/10 at rest and during low grade shoulder activito increase tolerance for ADLs and increased QoL.  2. Increase R shoulder PROM by 20 degrees in flexion and external rotation for increased functional mobility.  3. Increased  strength by 1/3 MMT grade in R shoulder to increase tolerance for ADL and work activities.  4. Pt to tolerate HEP to improve ROM and independence with ADL's.     Long Term Goals: 8 weeks  1. Report decreased R shoulder pain </= 1/10 for overhead activities to increase tolerance for ADLs and increased QoL.  2. Increase R shoulder AROM to 180 degrees of active abduction and flexion for increased functional mobility and independent ADLs with decreased pain.  3. Increase strength to >/= 4+/5 in BUE to increase tolerance for ADL and work activities.  4. Pt goal: to reduce my shoulder pain and raise my arm without difficulty   5. Pt will have improved to score of </= 27% limited on shoulder FOTO in order to demonstrate true functional improvement.  6. Pt will tests negative and/or very mild positive testing for R shoulder rotator cuff and labral special tests.     PLAN     Cont POC shoulder stability, RTC strengthening    RAPHAEL SINGH, PT

## 2022-02-02 NOTE — DISCHARGE INSTRUCTIONS
If your test com e back with any abnormal results that would require antibiotics they will be called in for you. You can access your results on [employer] as well.

## 2022-02-02 NOTE — FIRST PROVIDER EVALUATION
Emergency Department TeleTriage Encounter Note      CHIEF COMPLAINT    Chief Complaint   Patient presents with    Exposure to STD     Pt arrives with concerns of std exposure; pt reports her partner was seen and treated for burning with urination and she is concerned for STD exposure, she denies unprotected sex. Denies urinary difficulties.        VITAL SIGNS   Initial Vitals [02/01/22 2336]   BP Pulse Resp Temp SpO2   (!) 169/72 86 17 98.8 °F (37.1 °C) 100 %      MAP       --            ALLERGIES    Review of patient's allergies indicates:  No Known Allergies    PROVIDER TRIAGE NOTE  This is a teletriage evaluation of a 26 y.o. female presenting to the ED with c/o concern of stds d/t BF having dysuria. Denies s/s. Initial orders will be placed and care will be transferred to an alternate provider when patient is roomed for a full evaluation. Any additional orders and the final disposition will be determined by that provider.         ORDERS  Labs Reviewed   C. TRACHOMATIS/N. GONORRHOEAE BY AMP DNA   POCT URINE PREGNANCY       ED Orders (720h ago, onward)    Start Ordered     Status Ordering Provider    02/01/22 2341 02/01/22 2340  C. trachomatis/N. gonorrhoeae by AMP DNA [facility]; Urine  STAT         Ordered [facility] O    02/01/22 2341 02/01/22 2340  POCT urine pregnancy  Once         Ordered [facility] DE [facility] O            Virtual Visit Note: The provider triage portion of this emergency department evaluation and documentation was performed via [employer], a HIPAA-compliant telemedicine application, in concert with a tele-presenter in the room. A face to face patient evaluation with one of my colleagues will occur once the patient is placed in an emergency department room.      DISCLAIMER: This note was prepared with [employer] voice recognition transcription software. Garbled syntax, mangled pronouns, and other bizarre constructions may be attributed to that software system.

## 2022-02-02 NOTE — PROGRESS NOTES
"Referring provider: Dr. Colin Wyman  Reason for visit:  Behavioral and qualitative analysis of voice and resonance (CPT 20673)  Session # 1  The patient location is: [place]  The chief complaint leading to consultation is: dysphonia   Visit type: audiovisual  Face to Face time with patient: 35  60 minutes of total time spent on the encounter, which includes face to face time and non-face to face time preparing to see the patient (eg, review of tests), Obtaining and/or reviewing separately obtained history, Documenting clinical information in the electronic or other health record, Independently interpreting results (not separately reported) and communicating results to the patient/family/caregiver, or Care coordination (not separately reported).     History / Subjective   I had the pleasure of seeing Nae Escalante who returns for her first treatment session following complete voice evaluation on 1/10/2022. She says her voice has been OK and states: "I haven't been having too many hoarse episodes and that's good." Her voice is more stable; she rarely has days where she gets hoarse or loses her voice, now. She endorses daily use of humidifier, voice HEP ewith straw phonation and increased hydration. 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 She characterizes a recent episode where she went to the ER for shortness of breath, last Friday 1/27/2022. Had chest x-ray imaging; diagnosed with pleurisy. Following that trip to the ER she was prescribed toradol for SOB. Now that she is taking medication, her breathing is much better and her voice improved. Persistent vocal complaints include: difficulty projecting, low F0 and diminished acces to upper register of singing voice.  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 She loves to sing and wishes to rehabilitate speaking and singing voice.    Swallowing: no  Breathing: WNL    Smoking: no  Caffeine: no  Reflux: unsure  Water: plenty    Rigid stroboscopy findings per Valorie Rowland on 2022  1. B TVF fully ab/adduct  2. B TVF mid fold swelling  3. B TVF amplitude WNL, B TVF wave motion at times asymmetric  4.  Hourglass glottal configuration resulting in glottic insufficiency at times                   No past medical history on file.  Current Outpatient Medications on File Prior to Visit   Medication Sig Dispense Refill    ergocalciferol (ERGOCALCIFEROL) 50,000 unit Cap Take 1 capsule (50,000 Units total) by mouth every 7 days. 12 capsule 0    [] ketorolac (TORADOL) 10 mg tablet Take 1 tablet (10 mg total) by mouth every 6 (six) hours as needed for Pain. 20 tablet 0     No current facility-administered medications on file prior to visit.       Objective   The primary purpose of today's session was review of HEP. This was targeting using SOVT and RFT treatment modalities.    Perceptual assessment:    -CAPE-V Overall Score: 6  -Quality: intermittent elliott  -Volume: appropriate for age and gender  -Pitch: low F0, decreased acces to upper register  -Flexibility: appropriate for age and gender  -Habitual respiratory pattern: chest/clavicular.    The Reflux Symptom Index (RSI)   Score: 20 on 1/10/2022    Clinical Evaluation/Treatment  Mrs. Nae Escalante presents on today's call with an improved vocal quality that is clear with intermittent elliott. She was retrained in SOVT straw phonation exercises and given cues for easy versus strained phonation, flow phonation and elimination of glottal attack at the end of power sustains and glides. She is receptive to tactile and auditory cues. When prompted for lax jaw and round lip posture, improvements in resonance and volume were noted. Following SOVT exercises, improvements in vocal clarity and volume were  "maintained in conversation. She describes the feeling of forward resonance as "it feels like my nose is vibrating and there is buzzing in my cheeks and lips."     Education provided on the findings from laryngeal imaging as they pertain to the anatomy and physiology of voice production, the patient's reported symptoms and plan of care for management of dysphonia. Discussed importance of vocal hygiene including: hydration, conservation, reducing caffeine/drying agents and reducing throat clearing, coughing, other phonotraumatic behaviors. Introduced a resonant focused /m/ as a strategy for maintaining productive vocal behaviors in  Conversation. Patient was encouraged to continue daily home exercise program: SOVT exercises with straw phonation, practicing exercises several times daily in isolation and into short phrases to solidify muscle memory patterns and reduce extralaryngeal tension during speech tasks. She was given a list of OTC lozenges per request. She was encouraged to continue steam, gargle and practice voice conservation when possible.  She was amenable to all suggestions.     Functional goals  Length Status Goal   Long term Ongoing Patient will implement and adhere to vocal hygiene protocols on a daily basis, including the elimination of phonotraumatic behaviors.    Long term Ongoing Patient and clinician will facilitate changes in vocal function in order to restore functional use of voice for daily occupational, social, and emotional demands.    Long term Met Patient will re-establish phonation with adequate balance of airflow and resonance with decreased muscle tension.    Long term  Met Patient will improve coordination of respiration and phonation for efficient vocal production at a conversational level.    Short term  Met Patient will complete SOVT exercises and/or resonant-focused exercises 3-5x daily to strengthen and balance the intrinsic laryngeal musculature and maximize glottic closure without " medial hyperfunction.   Short term Met Patient will improve the quality, efficiency, and ease of phonation through resonant and/or airflow exercises from the syllable to conversation level with 80% accuracy.   Short term Ongoing Patient will discriminate between easy and strained phonation with 80% accuracy.    Short term Ongoing Patient will demonstrate the ability to increase awareness of voicing behavior through self-monitoring to facilitate generalization in functional speaking situations with 80% accuracy.        Assessment     Nae Escalante presents with mild dysphonia secondary to muscle tension dysphonia as diagnosed by Dr. Wyman. She continues to improve with adherence to voice therapy plan. Prognosis for continued improvement is good.     Recommendations / POC    -Recommend discharge patient today with instructions to continue use of voice HEP, laryngeal hygiene measures, environmental modification and conservation.  -Contact clinician with any further questions   -Schedule with ENT/voice therapy on an as needed basis should voice problem return or if repeat imaging is desired

## 2022-02-02 NOTE — ED PROVIDER NOTES
CHIEF COMPLAINT:  Possible exposure to STD    HPI:     Nae Escalatne 26 y.o. presents to the emergency department today for a possible exposure to an STD. Pt reports that her boyfriend told her that he has some burning with urination, so to be on the safe side she'd like to be tested for STD's. She denies unprotected sex. The patient denies any symptoms at this time. Denies any vaginal discharge, vaginal pain, pelvic pain, abdominal pain, fever, chills or sweats, change in urination, dysuria, hematuria, frequency or urgency, or any other concerning symptoms        ROS    Constitutional: No fever, no chills.  Eyes: No discharge. No pain.  HENT: No nasal drainage. No ear ache. No sore throat.  Cardiovascular: No chest pain, no palpitations.  Respiratory: No cough, no shortness of breath.  Gastrointestinal: Lower abdominal pain. No vomiting. No diarrhea.  Genitourinary: No hematuria, dysuria, urgency. No penile pain or discharge.   Musculoskeletal: No back pain.  Skin: No rashes, no lesions.  Neurological: No headache, no focal weakness.    History obtained from pt.     No past medical history on file.  No past surgical history on file.  Family History   Problem Relation Age of Onset    Diabetes Father     Prostate cancer Father     Breast cancer Maternal Aunt      Social History     Tobacco Use    Smoking status: Never Smoker    Smokeless tobacco: Never Used   Substance Use Topics    Alcohol use: Never    Drug use: Never         Nurse's note reviewed.  Vital signs reviewed.    BP (!) 169/72 (BP Location: Right arm, Patient Position: Sitting)   Pulse 86   Temp 98.8 °F (37.1 °C) (Oral)   Resp 17   Wt 77.1 kg (170 lb)   SpO2 100%   BMI 30.11 kg/m²   Physical Exam  Vitals and nursing note reviewed.   Constitutional:       General: She is not in acute distress.     Appearance: She is well-developed and well-nourished.   HENT:      Head: Normocephalic and atraumatic.      Nose: Nose normal.   Eyes:       General:         Right eye: No discharge.         Left eye: No discharge.      Extraocular Movements: EOM normal.      Conjunctiva/sclera: Conjunctivae normal.   Cardiovascular:      Rate and Rhythm: Normal rate.   Pulmonary:      Effort: Pulmonary effort is normal. No respiratory distress.   Abdominal:      General: There is no distension.   Genitourinary:     Comments: Deferred    Musculoskeletal:         General: Normal range of motion.      Cervical back: Neck supple.      Comments: Gait normal.    Skin:     General: Skin is warm and dry.   Neurological:      Mental Status: She is alert and oriented to person, place, and time.      Cranial Nerves: No cranial nerve deficit.      Motor: No abnormal muscle tone.   Psychiatric:         Mood and Affect: Mood and affect normal.         Differential diagnosis:  After history and physical exam in diet frontal diagnosis was considered but was not limited to STI, vaginitis, UTI, ovarian torsion, ovarian cyst.    Initial:  Nae Escalante presents to the emergency department today boyfriend had burning with urination. She has no symptoms.             Labs Reviewed   C. TRACHOMATIS/N. GONORRHOEAE BY AMP DNA   URINALYSIS, REFLEX TO URINE CULTURE   POCT URINE PREGNANCY                  MDM  MDM: Flavia Sal presents to the ED today to be on the safe side after her boyfriend had burning with urination. She has no symptoms. I have discussed with her the current regimen for STD treatment. At this time as she is asymptomatic, she prefers to wait for her results to return. This is an appropriate course to take.  Discussed no sex until results are known.  The patient appears to be low risk for an emergent medical condition and I feel it is safe and appropriate at this time for the patient to be discharged to follow-up as detailed in their discharge instructions for reevaluation and possible continued outpatient workup and management. Regardless, an unremarkable evaluation in  the ED does not preclude the development or presence of a serious or life threatening condition. As such, patient was instructed to return immediately for any worsening or change in current symptoms. Precautions for return discussed at length.  Discharge and follow-up instructions discussed with the patient who expressed understanding and willingness to comply with my recommendations.    Voice recognition software utilized in this note.    The encounter diagnosis was Screening for STD (sexually transmitted disease).      The pt had an elevated blood pressure here in the emergency department.  While this could be causing many different things such as pain and even just being in the emergency department the patient understands the need to follow-up with his PCP for further management.  They will need to follow up with their PCP for further evaluation and management in 2-3 days.            Dewey Lloyd MD  02/02/22 0105

## 2022-02-04 ENCOUNTER — CLINICAL SUPPORT (OUTPATIENT)
Dept: REHABILITATION | Facility: HOSPITAL | Age: 27
End: 2022-02-04
Payer: COMMERCIAL

## 2022-02-04 DIAGNOSIS — M25.611 DECREASED ROM OF RIGHT SHOULDER: ICD-10-CM

## 2022-02-04 DIAGNOSIS — M25.511 CHRONIC RIGHT SHOULDER PAIN: ICD-10-CM

## 2022-02-04 DIAGNOSIS — R53.1 DECREASED STRENGTH: ICD-10-CM

## 2022-02-04 DIAGNOSIS — G89.29 CHRONIC RIGHT SHOULDER PAIN: ICD-10-CM

## 2022-02-04 PROCEDURE — 97110 THERAPEUTIC EXERCISES: CPT | Mod: PN

## 2022-02-09 ENCOUNTER — CLINICAL SUPPORT (OUTPATIENT)
Dept: REHABILITATION | Facility: HOSPITAL | Age: 27
End: 2022-02-09
Payer: COMMERCIAL

## 2022-02-09 DIAGNOSIS — G89.29 CHRONIC RIGHT SHOULDER PAIN: ICD-10-CM

## 2022-02-09 DIAGNOSIS — M25.511 CHRONIC RIGHT SHOULDER PAIN: ICD-10-CM

## 2022-02-09 DIAGNOSIS — R53.1 DECREASED STRENGTH: ICD-10-CM

## 2022-02-09 DIAGNOSIS — M25.611 DECREASED ROM OF RIGHT SHOULDER: ICD-10-CM

## 2022-02-09 PROCEDURE — 97110 THERAPEUTIC EXERCISES: CPT | Mod: PN

## 2022-02-09 NOTE — PROGRESS NOTES
[facility]   Physical Therapy Treatment Note     Name: Nae Escalante  Clinic Number: [id number]    Therapy Diagnosis:   Encounter Diagnoses   Name Primary?    Chronic right shoulder pain     Decreased ROM of right shoulder     Decreased strength      Physician: Paul Ventura MD    Visit Date: 2/9/2022  Physician Orders: PT Eval and Treat  Medical Diagnosis from Referral: M75.111 (ICD-10-CM) - Nontraumatic incomplete tear of right rotator cuff  Evaluation Date: 1/14/2022  Authorization Period Expiration: 12/31/2022  Plan of Care Expiration: 3/11/2022  Visit # / Visits authorized: 4/60  FOTO: 4/5 PTA Visit: 0/6    Time In: 1415  Time Out: 1505   Total Billable Time:  45 minutes (3 TE)  Precautions: standard    SUBJECTIVE     Pt reports: usual shoulder pain but improving bouts of intense pain. Improving overhead ROM.      She was compliant with home exercise program.  Response to previous treatment: no adverse effects.   Functional change: ongoing    Pain: 4/10  Location: right shoulder      OBJECTIVE     R shoulder AROM flexion 140 degrees today. .      Treatment     Nae received the treatments listed below:    Therapeutic exercises to develop strength, endurance, ROM, flexibility, posture and core stabilization for 40 minutes including:    Rhythmic perturbations  Flexion 90 degrees -> 5 rounds      ER/IR various angles --> 5 rounds  Reversal of agonists  ER/IR various angles --> 5 rounds    Serratus punches  3x15 3# dumbbell series  Shoulder taps    3'x1  High mat triceps push-ups with plus 1x1' (mid-range only)    Prone scapular retractions 20x   Prone shoulder extension 20x  Prone horizontal abd  10x      manual therapy techniques: Joint mobilizations were applied to the: R shoulder for 5 minutes, including:  Shoulder passive physiological flexion, ER, IR grade II-III      Patient Education and Home Exercises     Home Exercises Provided and Patient Education Provided    Education provided:   - cont HEP    Written Home Exercises Provided: yes. Exercises were reviewed and Nae was able to demonstrate them prior to the end of the session.  Nae demonstrated good  understanding of the education provided. See EMR under Patient Instructions for exercises provided during therapy sessions    ASSESSMENT   Nae is a 25 y/o F with R shoulder pain.  History of competitive gymnastics.  She has been compliant with her HEP and holding UE gym resistance training at this time.  Moderate-to-low shoulder irritability. Improving overhead ROM without pain.  Anterior instability seems to be a part of her clinical picture at this time.  Focus of session today as well as previous sessions on improving RC recruitment in gravity eliminated positions and scapular stability program.  Educated again of pain versus soreness response to interventions.  Follow pain monitoring model for graded PRE and joint loading.        Nae Is progressing well towards her goals.   Pt prognosis is Excellent.     Pt will continue to benefit from skilled outpatient physical therapy to address the deficits listed in the problem list box on initial evaluation, provide pt/family education and to maximize pt's level of independence in the home and community environment.   Pt's spiritual, cultural and educational needs considered and pt agreeable to plan of care and goals.     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 Report decreased R shoulder pain </= 2/10 at rest and during low grade shoulder activito increase tolerance for ADLs and increased QoL. Progressing towards; not met  2. Increase R shoulder PROM by 20 degrees in flexion and external rotation for increased functional mobility.  Progressing towards; not met  3. Increased strength by 1/3 MMT grade in R shoulder to increase tolerance for ADL and work activities.  Progressing towards;  not met  4. Pt to tolerate HEP to improve ROM and independence with ADL's.  Progressing towards; not met     Long Term Goals: 8 weeks  1. Report decreased R shoulder pain </= 1/10 for overhead activities to increase tolerance for ADLs and increased QoL.  Progressing towards; not met  2. Increase R shoulder AROM to 180 degrees of active abduction and flexion for increased functional mobility and independent ADLs with decreased pain. Progressing towards; not met  3. Increase strength to >/= 4+/5 in BUE to increase tolerance for ADL and work activities. Progressing towards; not met  4. Pt goal: to reduce my shoulder pain and raise my arm without difficulty. Progressing towards; not met   5. Pt will have improved to score of </= 27% limited on shoulder FOTO in order to demonstrate true functional improvement. Progressing towards; not met  6. Pt will tests negative and/or very mild positive testing for R shoulder rotator cuff and labral special tests.  Progressing towards; not met    PLAN     Cont POC shoulder stability, RTC strengthening    Tawanda Wilson, PT, DPT, OCS

## 2022-02-11 ENCOUNTER — CLINICAL SUPPORT (OUTPATIENT)
Dept: REHABILITATION | Facility: HOSPITAL | Age: 27
End: 2022-02-11
Payer: COMMERCIAL

## 2022-02-11 DIAGNOSIS — G89.29 CHRONIC RIGHT SHOULDER PAIN: ICD-10-CM

## 2022-02-11 DIAGNOSIS — M25.511 CHRONIC RIGHT SHOULDER PAIN: ICD-10-CM

## 2022-02-11 DIAGNOSIS — M25.611 DECREASED ROM OF RIGHT SHOULDER: ICD-10-CM

## 2022-02-11 DIAGNOSIS — R53.1 DECREASED STRENGTH: ICD-10-CM

## 2022-02-11 PROCEDURE — 97110 THERAPEUTIC EXERCISES: CPT | Mod: PN

## 2022-02-22 ENCOUNTER — CLINICAL SUPPORT (OUTPATIENT)
Dept: REHABILITATION | Facility: HOSPITAL | Age: 27
End: 2022-02-22
Payer: COMMERCIAL

## 2022-02-22 DIAGNOSIS — R53.1 DECREASED STRENGTH: ICD-10-CM

## 2022-02-22 DIAGNOSIS — M25.511 CHRONIC RIGHT SHOULDER PAIN: Primary | ICD-10-CM

## 2022-02-22 DIAGNOSIS — G89.29 CHRONIC RIGHT SHOULDER PAIN: Primary | ICD-10-CM

## 2022-02-22 DIAGNOSIS — M25.611 DECREASED ROM OF RIGHT SHOULDER: ICD-10-CM

## 2022-02-22 PROCEDURE — 97110 THERAPEUTIC EXERCISES: CPT | Mod: PN

## 2022-02-22 PROCEDURE — 97140 MANUAL THERAPY 1/> REGIONS: CPT | Mod: PN

## 2022-02-22 NOTE — PROGRESS NOTES
"[facility]   Physical Therapy Treatment Note     Name: Nae Escalante  Clinic Number: [id number]    Therapy Diagnosis:   Encounter Diagnoses   Name Primary?    Chronic right shoulder pain Yes    Decreased ROM of right shoulder     Decreased strength      Physician: Paul Ventura MD    Visit Date: 2/22/2022  Physician Orders: PT Eval and Treat  Medical Diagnosis from Referral: M75.111 (ICD-10-CM) - Nontraumatic incomplete tear of right rotator cuff  Evaluation Date: 1/14/2022  Authorization Period Expiration: 12/31/2022  Plan of Care Expiration: 3/11/2022  Visit # / Visits authorized: 6/60    FOTO: 6/5 - Next PTA Visit: 0/6    Time In: 7:05 AM  Time Out: 8:00 AM  Total Billable Time:  55 minutes (3 TE, 1 MT)  Precautions: standard    SUBJECTIVE     Pt reports: right shoulder pain 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 Pain at night, but able to fall asleep now.   She was compliant with home exercise program.  Response to previous treatment: no adverse effects.   Functional change: ongoing    Pain: 1/10  Location: right shoulder    OBJECTIVE     R shoulder AROM flexion 152 degrees today.      Treatment     Nae received the treatments listed below:    Therapeutic exercises to develop strength, endurance, ROM, flexibility, posture and core stabilization for 45 minutes including:    Rhythmic perturbations  Flexion 90, 120 degrees -> 5 rounds      ER/IR various angles --> 5 rounds  Reversal of agonists  ER/IR various angles --> 5 rounds    Serratus punches  3x15 3# dumbbell series  Shoulder taps    3'x1  High mat triceps push-ups with plus 1x1' (mid-range only)    Prone shoulder extension 2x10, 1# B  Prone horizontal abd  10x - not done today  Mat plank on fitter   30" x3    manual therapy techniques: Joint mobilizations were applied to the: R shoulder for 10 minutes, including:  Shoulder passive physiological flexion, ER, IR grade II-III    Patient Education and Home " Exercises     Home Exercises Provided and Patient Education Provided   Education provided:   - cont HEP    Written Home Exercises Provided: yes. Exercises were reviewed and Nae was able to demonstrate them prior to the end of the session.  Nae demonstrated good  understanding of the education provided. See EMR under Patient Instructions for exercises provided during therapy sessions    ASSESSMENT   Nae is a 27 y/o F with R shoulder pain.  History of competitive gymnastics.  Slight right shoulder pain with serratus punches in supine that improved with slight shoulder external rotation. Max 3-4/10 right shoulder pain, and subtle discomfort after session. 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 Pt progressing well and goal is to keep pain very low for closed chain strengthening activities.     Nae Is progressing well towards her goals.   Pt prognosis is Excellent.     Pt will continue to benefit from skilled outpatient physical therapy to address the deficits listed in the problem list box on initial evaluation, provide pt/family education and to maximize pt's level of independence in the home and community environment.   Pt's spiritual, cultural and educational needs considered and pt agreeable to plan of care and goals.     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 Report decreased R shoulder pain </= 2/10 at rest and during low grade shoulder activity  increase tolerance for ADLs and increased QoL. Progressing towards; not met  2. Increase R shoulder PROM by 20 degrees in flexion and external rotation for increased functional mobility.  Progressing towards; not met  3. Increased strength by 1/3 MMT grade in R shoulder to increase tolerance for ADL and work activities.   Progressing towards; not met  4. Pt to tolerate HEP to improve ROM and independence with ADL's.  Progressing towards; not met     Long Term Goals: 8 weeks  1. Report decreased R shoulder pain </= 1/10 for overhead activities to increase tolerance for ADLs and increased QoL.  Progressing towards; not met  2. Increase R shoulder AROM to 180 degrees of active abduction and flexion for increased functional mobility and independent ADLs with decreased pain. Progressing towards; not met  3. Increase strength to >/= 4+/5 in BUE to increase tolerance for ADL and work activities. Progressing towards; not met  4. Pt goal: to reduce my shoulder pain and raise my arm without difficulty. Progressing towards; not met   5. Pt will have improved to score of </= 27% limited on shoulder FOTO in order to demonstrate true functional improvement. Progressing towards; not met  6. Pt will tests negative and/or very mild positive testing for R shoulder rotator cuff and labral special tests.  Progressing towards; not met    PLAN     Cont POC shoulder stability, RTC strengthening    Baldo Nicole, PT, DPT

## 2022-02-25 ENCOUNTER — CLINICAL SUPPORT (OUTPATIENT)
Dept: REHABILITATION | Facility: HOSPITAL | Age: 27
End: 2022-02-25
Payer: COMMERCIAL

## 2022-02-25 DIAGNOSIS — R53.1 DECREASED STRENGTH: ICD-10-CM

## 2022-02-25 DIAGNOSIS — M25.511 CHRONIC RIGHT SHOULDER PAIN: Primary | ICD-10-CM

## 2022-02-25 DIAGNOSIS — G89.29 CHRONIC RIGHT SHOULDER PAIN: Primary | ICD-10-CM

## 2022-02-25 DIAGNOSIS — M25.611 DECREASED ROM OF RIGHT SHOULDER: ICD-10-CM

## 2022-02-25 PROCEDURE — 97140 MANUAL THERAPY 1/> REGIONS: CPT | Mod: PN

## 2022-02-25 PROCEDURE — 97110 THERAPEUTIC EXERCISES: CPT | Mod: PN

## 2022-02-25 NOTE — PROGRESS NOTES
[facility]   Physical Therapy Treatment Note     Name: Nae Escalante  Clinic Number: [id number]    Therapy Diagnosis:   Encounter Diagnoses   Name Primary?    Chronic right shoulder pain Yes    Decreased ROM of right shoulder     Decreased strength      Physician: Paul Ventura MD    Visit Date: 2/25/2022  Physician Orders: PT Eval and Treat  Medical Diagnosis from Referral: M75.111 (ICD-10-CM) - Nontraumatic incomplete tear of right rotator cuff  Evaluation Date: 1/14/2022  Authorization Period Expiration: 12/31/2022  Plan of Care Expiration: 3/11/2022  Visit # / Visits authorized: 7/60    FOTO: 7/5 - Next PTA Visit: 0/6    Time In: 1545  Time Out: 1640  Total Billable Time:  55 minutes (3 TE, 1 MT)  Precautions: standard    SUBJECTIVE     Pt reports: right shoulder pain - low level mostly throughout the day.  Occasional popping sensation with certain movements.   She was compliant with home exercise program.  Response to previous treatment: no adverse effects.   Functional change: ongoing    Pain: 3/10  Location: right shoulder    OBJECTIVE     right shoulder active range of motion flexion = 160 degrees     Treatment     Nae received the treatments listed below:    Manual therapy techniques: Joint mobilizations were applied to the: R shoulder for 10 minutes, including:  · Shoulder passive physiological flexion, ER, IR grade I-III for pain modulation    Therapeutic exercises to develop strength, endurance, ROM, flexibility, posture and core stabilization for 45 minutes including:  Rhythmic perturbations  Flexion 90, 120 degrees -> 5 rounds      ER/IR various angles --> 5 rounds  Reversal of agonists  ER/IR various angles --> 5 rounds    Supine shoulder flexion Short arc (140 - 70  Serratus punches  3x15 3# dumbbell series  Shoulder taps    3'x1 (not today)  Bosu ball planks  Ball circles 5 rounds  High mat triceps push-ups with plus 1x1' (mid-range only)    Prone  shoulder extension 2x10, 2# B  Prone horizontal abd  2x10        Patient Education and Home Exercises     Home Exercises Provided and Patient Education Provided   Education provided:   - cont HEP    Written Home Exercises Provided: yes. Exercises were reviewed and Nae was able to demonstrate them prior to the end of the session.  Nae demonstrated good  understanding of the education provided. See EMR under Patient Instructions for exercises provided during therapy sessions    ASSESSMENT   Nae is a 25 y/o F with R shoulder pain.  History of competitive gymnastics.  Making steady gains in her rehab program. Low grade pain. Avoiding provocative motions. Improving shoulder stability.  Continue to progress closed chain work.     Nae Is progressing well towards her goals.   Pt prognosis is Excellent.     Pt will continue to benefit from skilled outpatient physical therapy to address the deficits listed in the problem list box on initial evaluation, provide pt/family education and to maximize pt's level of independence in the home and community environment.   Pt's spiritual, cultural and educational needs considered and pt agreeable to plan of care and goals.     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 Report decreased R shoulder pain </= 2/10 at rest and during low grade shoulder activity  increase tolerance for ADLs and increased QoL. Progressing towards; not met  2. Increase R shoulder PROM by 20 degrees in flexion and external rotation for increased functional mobility.  Progressing towards; not met  3. Increased strength by 1/3 MMT grade in R shoulder to increase tolerance for ADL and work activities.  Progressing towards; not met  4. Pt to tolerate HEP to improve ROM and independence with ADL's.  Progressing towards; not met     Long Term Goals: 8 weeks  1. Report decreased R shoulder pain </= 1/10 for overhead activities  to increase tolerance for ADLs and increased QoL.  Progressing towards; not met  2. Increase R shoulder AROM to 180 degrees of active abduction and flexion for increased functional mobility and independent ADLs with decreased pain. Progressing towards; not met  3. Increase strength to >/= 4+/5 in BUE to increase tolerance for ADL and work activities. Progressing towards; not met  4. Pt goal: to reduce my shoulder pain and raise my arm without difficulty. Progressing towards; not met   5. Pt will have improved to score of </= 27% limited on shoulder FOTO in order to demonstrate true functional improvement. Progressing towards; not met  6. Pt will tests negative and/or very mild positive testing for R shoulder rotator cuff and labral special tests.  Progressing towards; not met    PLAN     Cont POC shoulder stability, RTC strengthening    Tawanda Wilson, PT, DPT

## 2022-03-18 ENCOUNTER — CLINICAL SUPPORT (OUTPATIENT)
Dept: REHABILITATION | Facility: HOSPITAL | Age: 27
End: 2022-03-18
Payer: COMMERCIAL

## 2022-03-18 DIAGNOSIS — G89.29 CHRONIC RIGHT SHOULDER PAIN: Primary | ICD-10-CM

## 2022-03-18 DIAGNOSIS — R53.1 DECREASED STRENGTH: ICD-10-CM

## 2022-03-18 DIAGNOSIS — M25.611 DECREASED ROM OF RIGHT SHOULDER: ICD-10-CM

## 2022-03-18 DIAGNOSIS — M25.511 CHRONIC RIGHT SHOULDER PAIN: Primary | ICD-10-CM

## 2022-03-18 PROCEDURE — 97110 THERAPEUTIC EXERCISES: CPT | Mod: PN

## 2022-03-18 PROCEDURE — 97140 MANUAL THERAPY 1/> REGIONS: CPT | Mod: PN

## 2022-03-18 NOTE — PROGRESS NOTES
[facility]   Physical Therapy Treatment Note     Name: Nae Escalante  Clinic Number: [id number]    Therapy Diagnosis:   Encounter Diagnoses   Name Primary?    Chronic right shoulder pain Yes    Decreased ROM of right shoulder     Decreased strength      Physician: Paul Ventura MD    Visit Date: 3/18/2022  Physician Orders: PT Eval and Treat  Medical Diagnosis from Referral: M75.111 (ICD-10-CM) - Nontraumatic incomplete tear of right rotator cuff  Evaluation Date: 1/14/2022  Authorization Period Expiration: 12/31/2022  Plan of Care Expiration: 5/18/22  Visit # / Visits authorized: 8/60    FOTO: 7/5 - complete 3/18/22  PTA Visit: 0/6    Time In: 8:08 am  Time Out: 9:02 am  Total Billable Time:  54 minutes (3 TE, 1 MT)  Precautions: standard    SUBJECTIVE     Pt reports: she tries not to over stress shoulder and stops painful activities when provoked.occasional popping through mid range of motion shoulder flexion with reaching. Overall notes improving range of motion and tolerance for job duties.    She was compliant with home exercise program.  Response to previous treatment: no adverse effects.   Functional change: ongoing    Pain: 1/10  Location: right shoulder    OBJECTIVE      Posture: anteriorly glided R humeral head, protracted R scapula, slight forward head, broad shoulders  Sensation: WNL at this time  Palpation: +TTP at long head biceps origin and distally, anterior and posterior aspect of right GHJ, supraspinatus insertion  Resting scapular assessment: abducted and depressed R>L scapula    Handedness: R     * = R shoulder pain with testing  NT = Not tested  AROM: CERVICAL   Flexion 100%   Extension 100%   Right side bending 100%   Left side bending 100%, some slight R upper trap discomfort   Right rotation 100%   Left rotation 100%      Shoulder   Right     Left   Pain/Dysfunction with Movement     AROM PROM MMT AROM PROM MMT     Upper trap NT NT NT WFL WFL 5/5      Middle trap NT NT NT WFL WFL NT     Lower trap NT NT NT WFL WFL NT     Serratus anterior NT NT NT WFL WFL NT     Flexion (180 deg) 180* 180 4/5* WFL WFL 4+/5     Extension (50 deg) 40* NT NT WFL WFL 5/5     Abduction (180 deg) 180* 180* 4/5* WFL WFL 4/5     Horizontal Adduction (30-45 deg) NT NT NT WFL WFL NT     IR (80 deg) 65 65* 4/5 WFL WFL 5/5     ER at 90° abd (90 deg) NT NT NT NT NT NT     ER at 0° abd  (90 deg) 80* 85* 4/5* WFL WFL 4/5           CMS Impairment/Limitation/Restriction for FOTO Shoulder Survey     Therapist reviewed FOTO scores for Nae Escalante on 1/14/2022.   FOTO documents entered into [employer] - see Media section.     Limitation Score: 50%  Category: Carrying  Predicted: 27%       Treatment     Nae received the treatments listed below:    Manual therapy techniques: Joint mobilizations were applied to the: R shoulder for 10 minutes, including:  · Shoulder passive physiological flexion, ER, IR grade I-III for pain modulation    Therapeutic exercises to develop strength, endurance, ROM, flexibility, posture and core stabilization for 44 minutes including:  Rhythmic perturbations  Flexion 90, 120 degrees -> 3 rounds      ER/IR various angles --> 3 rounds      Multidirectional/ EC 3 rounds   Reversal of agonists  ER/IR various angles --> 5 rounds    Supine shoulder flexion Short arc (140 - 70) 2x20  Serratus punches  3x15 3# dumbbell series  Shoulder taps    3'x1 (not today)  Bosu ball planks  Ball circles 5 rounds  High mat triceps push-ups with plus 1x1' (mid-range only)    Prone shoulder extension 3x15, 2# B  Prone horizontal abd  3x15    Standing ball circles on wall 20x cw/ccw  Banded shoulder flexion to 90 red theraband 2x10      Patient Education and Home Exercises     Home Exercises Provided and Patient Education Provided   Education provided:   - cont HEP    Written Home Exercises Provided: yes. Exercises were reviewed and Nae was able to demonstrate them prior to the end of the  session.  Nae demonstrated good  understanding of the education provided. See EMR under Patient Instructions for exercises provided during therapy sessions    ASSESSMENT   Nae is a 27 y/o F with R shoulder pain.  History of competitive gymnastics.  Pt tolerated progressions of shoulder stabilization to work on RTC activation and timing with emphasis on reactive stability in addition to anticipatory stabilization. Pt demos good improvements in shoulder range of motion.  Pt appropriately fatigued post treatment. Continue to progress closed chain work and shoulder stability.     Nae Is progressing well towards her goals.   Pt prognosis is Excellent.     Pt will continue to benefit from skilled outpatient physical therapy to address the deficits listed in the problem list box on initial evaluation, provide pt/family education and to maximize pt's level of independence in the home and community environment.   Pt's spiritual, cultural and educational needs considered and pt agreeable to plan of care and goals.     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 Report decreased R shoulder pain </= 2/10 at rest and during low grade shoulder activity  increase tolerance for ADLs and increased QoL. Progressing towards; not met  2. Increase R shoulder PROM by 20 degrees in flexion and external rotation for increased functional mobility. Met  3. Increased strength by 1/3 MMT grade in R shoulder to increase tolerance for ADL and work activities.  Met  4. Pt to tolerate HEP to improve ROM and independence with ADL's.  Met     Long Term Goals: 8 weeks  1. Report decreased R shoulder pain </= 1/10 for overhead activities to increase tolerance for ADLs and increased QoL.  Progressing towards; not met  2. Increase R shoulder AROM to 180 degrees of active abduction and flexion for increased functional mobility and independent ADLs with decreased  pain. Progressing towards; not met  3. Increase strength to >/= 4+/5 in BUE to increase tolerance for ADL and work activities. Progressing towards; not met  4. Pt goal: to reduce my shoulder pain and raise my arm without difficulty. Progressing towards; not met   5. Pt will have improved to score of </= 27% limited on shoulder FOTO in order to demonstrate true functional improvement. Progressing towards; not met  6. Pt will tests negative and/or very mild positive testing for R shoulder rotator cuff and labral special tests.  Progressing towards; not met    PLAN   Update plan of care certification 10 visits until 5/18/22    Cont POC shoulder stability, RTC strengthening    RAPHAEL SINGH, PT, DPT

## 2022-03-18 NOTE — PLAN OF CARE
[facility]   Physical Therapy Plan of care update    Name: Nae Escalante  Clinic Number: [id number]    Therapy Diagnosis:   Encounter Diagnoses   Name Primary?    Chronic right shoulder pain Yes    Decreased ROM of right shoulder     Decreased strength      Physician: Paul Ventura MD    Visit Date: 3/18/2022  Physician Orders: PT Eval and Treat  Medical Diagnosis from Referral: M75.111 (ICD-10-CM) - Nontraumatic incomplete tear of right rotator cuff  Evaluation Date: 1/14/2022  Authorization Period Expiration: 12/31/2022  Plan of Care Expiration:5/18/22  Visit # / Visits authorized: 8/60    FOTO: 7/5 - complete 3/18/22  PTA Visit: 0/6    Time In: 8:08 am  Time Out: 9:02 am  Total Billable Time:  54 minutes (3 TE, 1 MT)  Precautions: standard    SUBJECTIVE     Pt reports: she tries not to over stress shoulder and stops painful activities when provoked.occasional popping through mid range of motion shoulder flexion with reaching. Overall notes improving range of motion and tolerance for job duties.    She was compliant with home exercise program.  Response to previous treatment: no adverse effects.   Functional change: ongoing    Pain: 1/10  Location: right shoulder    OBJECTIVE      Posture: anteriorly glided R humeral head, protracted R scapula, slight forward head, broad shoulders  Sensation: WNL at this time  Palpation: +TTP at long head biceps origin and distally, anterior and posterior aspect of right GHJ, supraspinatus insertion  Resting scapular assessment: abducted and depressed R>L scapula    Handedness: R     * = R shoulder pain with testing  NT = Not tested  AROM: CERVICAL   Flexion 100%   Extension 100%   Right side bending 100%   Left side bending 100%, some slight R upper trap discomfort   Right rotation 100%   Left rotation 100%      Shoulder   Right     Left   Pain/Dysfunction with Movement     AROM PROM MMT AROM PROM MMT     Upper trap NT NT NT WFL WFL 5/5      Middle trap NT NT NT WFL WFL NT     Lower trap NT NT NT WFL WFL NT     Serratus anterior NT NT NT WFL WFL NT     Flexion (180 deg) 180* 180 4/5* WFL WFL 4+/5     Extension (50 deg) 40* NT NT WFL WFL 5/5     Abduction (180 deg) 180* 180* 4/5* WFL WFL 4/5     Horizontal Adduction (30-45 deg) NT NT NT WFL WFL NT     IR (80 deg) 65 65* 4/5 WFL WFL 5/5     ER at 90° abd (90 deg) NT NT NT NT NT NT     ER at 0° abd  (90 deg) 80* 85* 4/5* WFL WFL 4/5           CMS Impairment/Limitation/Restriction for FOTO Shoulder Survey     Therapist reviewed FOTO scores for Nae Escalante on 3/18/2022.   FOTO documents entered into [employer] - see Media section.     Limitation Score: 38%  Category: Carrying  Predicted: 27%       Treatment     Nae received the treatments listed in daily treatment note    Patient Education and Home Exercises     Home Exercises Provided and Patient Education Provided   Education provided:   - cont HEP    Written Home Exercises Provided: yes. Exercises were reviewed and Nae was able to demonstrate them prior to the end of the session.  Nae demonstrated good  understanding of the education provided. See EMR under Patient Instructions for exercises provided during therapy sessions    ASSESSMENT   Nae is a 26 y.o. female referred to outpatient Physical Therapy with a medical diagnosis of Nontraumatic incomplete tear of right rotator cuff presenting to PT at [facility]. History of competitive gymnastics.  Pt has been to 8 visits to date and making steady improvements in shoulder stability. Pt demonstrates full shoulder flexion/abd with minimal remaining limitation in shoulder rotation. She demonstrates improvements in upper extremity strength but with ongoing limitations with repetitive overhead motions and lifting required in her job duties. Pt without significant change in FOTO score due to continued challenge with overhead lifting. Pt is progressing well towards goals as  noted below and will continue to benefit from skilled PT focused on Rotator cuff stability and closed chain strengthening as tolerated.      Nae Is progressing well towards her goals.   Pt prognosis is Excellent.     Pt will continue to benefit from skilled outpatient physical therapy to address the deficits listed in the problem list box on initial evaluation, provide pt/family education and to maximize pt's level of independence in the home and community environment.   Pt's spiritual, cultural and educational needs considered and pt agreeable to plan of care and goals.     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 Report decreased R shoulder pain </= 2/10 at rest and during low grade shoulder activity  increase tolerance for ADLs and increased QoL. Progressing towards; not met  2. Increase R shoulder PROM by 20 degrees in flexion and external rotation for increased functional mobility. Met  3. Increased strength by 1/3 MMT grade in R shoulder to increase tolerance for ADL and work activities.  Met  4. Pt to tolerate HEP to improve ROM and independence with ADL's.  Met     Long Term Goals: 8 weeks  1. Report decreased R shoulder pain </= 1/10 for overhead activities to increase tolerance for ADLs and increased QoL.  Progressing towards; not met  2. Increase R shoulder AROM to 180 degrees of active abduction and flexion for increased functional mobility and independent ADLs with decreased pain. Progressing towards; not met  3. Increase strength to >/= 4+/5 in BUE to increase tolerance for ADL and work activities. Progressing towards; not met  4. Pt goal: to reduce my shoulder pain and raise my arm without difficulty. Progressing towards; not met   5. Pt will have improved to score of </= 27% limited on shoulder FOTO in order to demonstrate true functional improvement. Progressing towards; not met  6. Pt will tests negative and/or  very mild positive testing for R shoulder rotator cuff and labral special tests.  Progressing towards; not met    PLAN   Update plan of care certification 10 additional visits over 8 weeks until 5/18/22    shoulder stability, RTC strengthening    RAPHAEL SINGH, PT, DPT

## 2022-03-23 ENCOUNTER — CLINICAL SUPPORT (OUTPATIENT)
Dept: REHABILITATION | Facility: HOSPITAL | Age: 27
End: 2022-03-23
Payer: COMMERCIAL

## 2022-03-23 DIAGNOSIS — G89.29 CHRONIC RIGHT SHOULDER PAIN: Primary | ICD-10-CM

## 2022-03-23 DIAGNOSIS — M25.511 CHRONIC RIGHT SHOULDER PAIN: Primary | ICD-10-CM

## 2022-03-23 DIAGNOSIS — R53.1 DECREASED STRENGTH: ICD-10-CM

## 2022-03-23 DIAGNOSIS — M25.611 DECREASED ROM OF RIGHT SHOULDER: ICD-10-CM

## 2022-03-23 PROCEDURE — 97110 THERAPEUTIC EXERCISES: CPT | Mod: PN

## 2022-03-23 NOTE — PROGRESS NOTES
[facility]   Physical Therapy Treatment Note     Name: Nae Escalante  Clinic Number: [id number]    Therapy Diagnosis:   Encounter Diagnoses   Name Primary?    Chronic right shoulder pain Yes    Decreased ROM of right shoulder     Decreased strength      Physician: Paul Ventura MD    Visit Date: 3/23/2022  Physician Orders: PT Eval and Treat  Medical Diagnosis from Referral: M75.111 (ICD-10-CM) - Nontraumatic incomplete tear of right rotator cuff  Evaluation Date: 1/14/2022  Authorization Period Expiration: 12/31/2022  Plan of Care Expiration: 3/11/2022 to 5/18/22  Visit # / Visits authorized: 9/60    FOTO: at dc PTA Visit: 0/6    Time In: 1415  Time Out: 1515  Total Billable Time:  55 minutes (4 TE)  Precautions: standard    SUBJECTIVE     Pt reports: that overall her shoulder is feeling better.  She is eager to return to the gym.  Ultimate goal of being able to perform a handstand.    She was compliant with home exercise program.  Response to previous treatment: no adverse effects.   Functional change: ongoing    Pain: 1/10  Location: right shoulder    OBJECTIVE     (-) anterior instability today.   External rotation weakness right.      Treatment     Nae received the treatments listed below:    Manual therapy techniques: Joint mobilizations were applied to the: R shoulder for 5 minutes, including:  · Shoulder passive physiological flexion, ER, IR grade I-III for pain modulation    Therapeutic exercises to develop strength, endurance, ROM, flexibility, posture and core stabilization for 50 minutes including:  Rhythmic perturbations  Flexion 90, 120 degrees -> 3 rounds      ER/IR various angles --> 3 rounds      Multidirectional/ EC 3 rounds   Reversal of agonists  ER/IR various angles --> 5 rounds    Prone shoulder extension 3x15, 2# B  Prone horizontal abd  2x10 2# bilateral  Prone LT   2x10 0# bilateral    Shoulder external rotation/IR 90/90 20x red  theraband  Shoulder press (anterior bias) 2x10 --> work in posterior bias next    Push-up position instability series phase I/II/III 20x      Patient Education and Home Exercises     Home Exercises Provided and Patient Education Provided   Education provided:   - cont HEP    Written Home Exercises Provided: yes. Exercises were reviewed and Nae was able to demonstrate them prior to the end of the session.  Nae demonstrated good  understanding of the education provided. See EMR under Patient Instructions for exercises provided during therapy sessions    ASSESSMENT   Nae is a 27 y/o F with R shoulder pain.  History of competitive gymnastics.  No pain or instability or apprehension with passive flexion or passive 90/90 external rotation.  Progressed shoulder stabilization program today; increased intensity of RC and scapular exercises to continue to promote improving neuromuscular control and timing in end-range shoulder positions.     Nae Is progressing well towards her goals.   Pt prognosis is Excellent.     Pt will continue to benefit from skilled outpatient physical therapy to address the deficits listed in the problem list box on initial evaluation, provide pt/family education and to maximize pt's level of independence in the home and community environment.   Pt's spiritual, cultural and educational needs considered and pt agreeable to plan of care and goals.     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 Report decreased R shoulder pain </= 2/10 at rest and during low grade shoulder activity  increase tolerance for ADLs and increased QoL. Progressing towards; not met  2. Increase R shoulder PROM by 20 degrees in flexion and external rotation for increased functional mobility. Met  3. Increased strength by 1/3 MMT grade in R shoulder to increase tolerance for ADL and work activities.  Met  4. Pt to tolerate HEP to improve  ROM and independence with ADL's.  Met     Long Term Goals: 8 weeks  1. Report decreased R shoulder pain </= 1/10 for overhead activities to increase tolerance for ADLs and increased QoL.  Progressing towards; not met  2. Increase R shoulder AROM to 180 degrees of active abduction and flexion for increased functional mobility and independent ADLs with decreased pain. Progressing towards; not met  3. Increase strength to >/= 4+/5 in BUE to increase tolerance for ADL and work activities. Progressing towards; not met  4. Pt goal: to reduce my shoulder pain and raise my arm without difficulty. Progressing towards; not met   5. Pt will have improved to score of </= 27% limited on shoulder FOTO in order to demonstrate true functional improvement. Progressing towards; not met  6. Pt will tests negative and/or very mild positive testing for R shoulder rotator cuff and labral special tests.  Progressing towards; not met    PLAN     Cont POC shoulder stability, RTC strengthening    Tawanda Wilson, PT, DPT

## 2022-04-27 ENCOUNTER — OFFICE VISIT (OUTPATIENT)
Dept: INTERNAL MEDICINE | Facility: CLINIC | Age: 27
End: 2022-04-27
Payer: COMMERCIAL

## 2022-04-27 ENCOUNTER — LAB VISIT (OUTPATIENT)
Dept: LAB | Facility: HOSPITAL | Age: 27
End: 2022-04-27
Attending: INTERNAL MEDICINE
Payer: COMMERCIAL

## 2022-04-27 VITALS
WEIGHT: 173.31 LBS | BODY MASS INDEX: 30.71 KG/M2 | HEART RATE: 88 BPM | HEIGHT: 63 IN | SYSTOLIC BLOOD PRESSURE: 110 MMHG | OXYGEN SATURATION: 99 % | DIASTOLIC BLOOD PRESSURE: 64 MMHG

## 2022-04-27 DIAGNOSIS — Z00.00 PREVENTATIVE HEALTH CARE: ICD-10-CM

## 2022-04-27 DIAGNOSIS — Z23 NEED FOR VACCINATION: Primary | ICD-10-CM

## 2022-04-27 DIAGNOSIS — E55.9 VITAMIN D DEFICIENCY: ICD-10-CM

## 2022-04-27 LAB
ALBUMIN SERPL BCP-MCNC: 3.8 G/DL (ref 3.5–5.2)
ALP SERPL-CCNC: 68 U/L (ref 55–135)
ALT SERPL W/O P-5'-P-CCNC: 14 U/L (ref 10–44)
ANION GAP SERPL CALC-SCNC: 8 MMOL/L (ref 8–16)
AST SERPL-CCNC: 20 U/L (ref 10–40)
BASOPHILS # BLD AUTO: 0.04 K/UL (ref 0–0.2)
BASOPHILS NFR BLD: 0.8 % (ref 0–1.9)
BILIRUB DIRECT SERPL-MCNC: 0.1 MG/DL (ref 0.1–0.3)
BILIRUB SERPL-MCNC: 0.2 MG/DL (ref 0.1–1)
BUN SERPL-MCNC: 10 MG/DL (ref 6–20)
CALCIUM SERPL-MCNC: 9.1 MG/DL (ref 8.7–10.5)
CHLORIDE SERPL-SCNC: 104 MMOL/L (ref 95–110)
CHOLEST SERPL-MCNC: 204 MG/DL (ref 120–199)
CHOLEST/HDLC SERPL: 3.1 {RATIO} (ref 2–5)
CO2 SERPL-SCNC: 24 MMOL/L (ref 23–29)
CREAT SERPL-MCNC: 0.8 MG/DL (ref 0.5–1.4)
DIFFERENTIAL METHOD: ABNORMAL
EOSINOPHIL # BLD AUTO: 0.1 K/UL (ref 0–0.5)
EOSINOPHIL NFR BLD: 1.9 % (ref 0–8)
ERYTHROCYTE [DISTWIDTH] IN BLOOD BY AUTOMATED COUNT: 14.4 % (ref 11.5–14.5)
EST. GFR  (AFRICAN AMERICAN): >60 ML/MIN/1.73 M^2
EST. GFR  (NON AFRICAN AMERICAN): >60 ML/MIN/1.73 M^2
ESTIMATED AVG GLUCOSE: 114 MG/DL (ref 68–131)
GLUCOSE SERPL-MCNC: 64 MG/DL (ref 70–110)
HBA1C MFR BLD: 5.6 % (ref 4–5.6)
HCT VFR BLD AUTO: 31.4 % (ref 37–48.5)
HDLC SERPL-MCNC: 65 MG/DL (ref 40–75)
HDLC SERPL: 31.9 % (ref 20–50)
HGB BLD-MCNC: 10.2 G/DL (ref 12–16)
IMM GRANULOCYTES # BLD AUTO: 0.01 K/UL (ref 0–0.04)
IMM GRANULOCYTES NFR BLD AUTO: 0.2 % (ref 0–0.5)
LDLC SERPL CALC-MCNC: 126.4 MG/DL (ref 63–159)
LYMPHOCYTES # BLD AUTO: 1.7 K/UL (ref 1–4.8)
LYMPHOCYTES NFR BLD: 32.6 % (ref 18–48)
MCH RBC QN AUTO: 29.5 PG (ref 27–31)
MCHC RBC AUTO-ENTMCNC: 32.5 G/DL (ref 32–36)
MCV RBC AUTO: 91 FL (ref 82–98)
MONOCYTES # BLD AUTO: 0.5 K/UL (ref 0.3–1)
MONOCYTES NFR BLD: 9 % (ref 4–15)
NEUTROPHILS # BLD AUTO: 3 K/UL (ref 1.8–7.7)
NEUTROPHILS NFR BLD: 55.5 % (ref 38–73)
NONHDLC SERPL-MCNC: 139 MG/DL
NRBC BLD-RTO: 0 /100 WBC
PLATELET # BLD AUTO: 298 K/UL (ref 150–450)
PMV BLD AUTO: 10.9 FL (ref 9.2–12.9)
POTASSIUM SERPL-SCNC: 3.6 MMOL/L (ref 3.5–5.1)
PROT SERPL-MCNC: 7.9 G/DL (ref 6–8.4)
RBC # BLD AUTO: 3.46 M/UL (ref 4–5.4)
SODIUM SERPL-SCNC: 136 MMOL/L (ref 136–145)
TRIGL SERPL-MCNC: 63 MG/DL (ref 30–150)
WBC # BLD AUTO: 5.31 K/UL (ref 3.9–12.7)

## 2022-04-27 PROCEDURE — 90715 TDAP VACCINE GREATER THAN OR EQUAL TO 7YO IM: ICD-10-PCS | Mod: S$GLB,,, | Performed by: INTERNAL MEDICINE

## 2022-04-27 PROCEDURE — 90471 IMMUNIZATION ADMIN: CPT | Mod: S$GLB,,, | Performed by: INTERNAL MEDICINE

## 2022-04-27 PROCEDURE — 3008F BODY MASS INDEX DOCD: CPT | Mod: CPTII,S$GLB,, | Performed by: INTERNAL MEDICINE

## 2022-04-27 PROCEDURE — 99999 PR PBB SHADOW E&M-EST. PATIENT-LVL III: CPT | Mod: PBBFAC,,, | Performed by: INTERNAL MEDICINE

## 2022-04-27 PROCEDURE — 86592 SYPHILIS TEST NON-TREP QUAL: CPT | Performed by: INTERNAL MEDICINE

## 2022-04-27 PROCEDURE — 36415 COLL VENOUS BLD VENIPUNCTURE: CPT | Mod: PO | Performed by: INTERNAL MEDICINE

## 2022-04-27 PROCEDURE — 3008F PR BODY MASS INDEX (BMI) DOCUMENTED: ICD-10-PCS | Mod: CPTII,S$GLB,, | Performed by: INTERNAL MEDICINE

## 2022-04-27 PROCEDURE — 87389 HIV-1 AG W/HIV-1&-2 AB AG IA: CPT | Performed by: INTERNAL MEDICINE

## 2022-04-27 PROCEDURE — 3078F DIAST BP <80 MM HG: CPT | Mod: CPTII,S$GLB,, | Performed by: INTERNAL MEDICINE

## 2022-04-27 PROCEDURE — 3074F PR MOST RECENT SYSTOLIC BLOOD PRESSURE < 130 MM HG: ICD-10-PCS | Mod: CPTII,S$GLB,, | Performed by: INTERNAL MEDICINE

## 2022-04-27 PROCEDURE — 3078F PR MOST RECENT DIASTOLIC BLOOD PRESSURE < 80 MM HG: ICD-10-PCS | Mod: CPTII,S$GLB,, | Performed by: INTERNAL MEDICINE

## 2022-04-27 PROCEDURE — 99395 PR PREVENTIVE VISIT,EST,18-39: ICD-10-PCS | Mod: 25,S$GLB,, | Performed by: INTERNAL MEDICINE

## 2022-04-27 PROCEDURE — 99395 PREV VISIT EST AGE 18-39: CPT | Mod: 25,S$GLB,, | Performed by: INTERNAL MEDICINE

## 2022-04-27 PROCEDURE — 1159F PR MEDICATION LIST DOCUMENTED IN MEDICAL RECORD: ICD-10-PCS | Mod: CPTII,S$GLB,, | Performed by: INTERNAL MEDICINE

## 2022-04-27 PROCEDURE — 85025 COMPLETE CBC W/AUTO DIFF WBC: CPT | Performed by: INTERNAL MEDICINE

## 2022-04-27 PROCEDURE — 3074F SYST BP LT 130 MM HG: CPT | Mod: CPTII,S$GLB,, | Performed by: INTERNAL MEDICINE

## 2022-04-27 PROCEDURE — 86803 HEPATITIS C AB TEST: CPT | Performed by: INTERNAL MEDICINE

## 2022-04-27 PROCEDURE — 99999 PR PBB SHADOW E&M-EST. PATIENT-LVL III: ICD-10-PCS | Mod: PBBFAC,,, | Performed by: INTERNAL MEDICINE

## 2022-04-27 PROCEDURE — 90471 TDAP VACCINE GREATER THAN OR EQUAL TO 7YO IM: ICD-10-PCS | Mod: S$GLB,,, | Performed by: INTERNAL MEDICINE

## 2022-04-27 PROCEDURE — 1159F MED LIST DOCD IN RCRD: CPT | Mod: CPTII,S$GLB,, | Performed by: INTERNAL MEDICINE

## 2022-04-27 PROCEDURE — 90715 TDAP VACCINE 7 YRS/> IM: CPT | Mod: S$GLB,,, | Performed by: INTERNAL MEDICINE

## 2022-04-27 PROCEDURE — 80061 LIPID PANEL: CPT | Performed by: INTERNAL MEDICINE

## 2022-04-27 PROCEDURE — 80048 BASIC METABOLIC PNL TOTAL CA: CPT | Performed by: INTERNAL MEDICINE

## 2022-04-27 PROCEDURE — 80076 HEPATIC FUNCTION PANEL: CPT | Performed by: INTERNAL MEDICINE

## 2022-04-27 PROCEDURE — 83036 HEMOGLOBIN GLYCOSYLATED A1C: CPT | Performed by: INTERNAL MEDICINE

## 2022-04-27 RX ORDER — ERGOCALCIFEROL 1.25 MG/1
50000 CAPSULE ORAL
Qty: 12 CAPSULE | Refills: 0 | Status: SHIPPED | OUTPATIENT
Start: 2022-04-27 | End: 2024-01-31 | Stop reason: ALTCHOICE

## 2022-04-27 NOTE — PROGRESS NOTES
"  Assessment:       1. Need for vaccination    2. Vitamin D deficiency    3. Preventative health care              Plan:             Nae was seen today for establish care.    Diagnoses and all orders for this visit:    Need for vaccination  -     (In Office Administered) Tdap Vaccine    Vitamin D deficiency  -     ergocalciferol (ERGOCALCIFEROL) 50,000 unit Cap; Take 1 capsule (50,000 Units total) by mouth every 7 days.    Preventative health care  -     Hepatitis C Antibody; Future  -     HIV 1/2 Ag/Ab (4th Gen); Future  -     RPR; Future  -     Hemoglobin A1C; Future  -     Lipid Panel; Future  -     CBC Auto Differential; Future  -     Basic Metabolic Panel; Future  -     Hepatic Function Panel; Future  -     Cancel: Tdap Vaccine              Subjective:       Patient ID: Nae Escalante is a 27 y.o. female.    Chief Complaint:   Establish Care      HPI    #Last visit/Previous Provider  This patient is new to me  Previously seen by prem castrejon      Link to History           #Interim Hx    No urgent care or ED/hospitalization    #Concerns Today      #Chronic Problems    Patient Active Problem List   Diagnosis    Impingement syndrome of right shoulder    Instability of both shoulder joints    Nontraumatic incomplete tear of right rotator cuff    Chronic right shoulder pain    Decreased ROM of right shoulder    Decreased strength           Health Maintenance Due   Topic Date Due    Hepatitis C Screening  Never done       Health Maintenance Topics with due status: Not Due       Topic Last Completion Date    Pap Smear 04/09/2021    TETANUS VACCINE 04/27/2022         Tobacco Use: Low Risk     Smoking Tobacco Use: Never Smoker    Smokeless Tobacco Use: Never Used           Review of Systems      Objective:   /64 (BP Location: Right arm, Patient Position: Sitting, BP Method: Large (Manual))   Pulse 88   Ht 5' 3" (1.6 m)   Wt 78.6 kg (173 lb 4.5 oz)   LMP 04/09/2022   SpO2 99%   BMI 30.70 kg/m² "     Vitals 4/27/2022 2/1/2022 1/27/2022 12/15/2021 10/27/2021   Height 63 - 63 63 63   Weight (lbs) 173.28 170 170 178 178.57   BMI (kg/m2) 30.7 - 30.1 31.5 31.6            Physical Exam        ASCVD  The ASCVD Risk score (Polo PINTO Jr., et al., 2013) failed to calculate for the following reasons:    The 2013 ASCVD risk score is only valid for ages 40 to 79    Basic Labs  BMP  Lab Results   Component Value Date     (L) 05/01/2021    K 4.3 05/01/2021     05/01/2021    CO2 20 (L) 05/01/2021    BUN 17 05/01/2021    CREATININE 1.0 05/01/2021    CALCIUM 9.0 05/01/2021    ANIONGAP 13 05/01/2021    ESTGFRAFRICA >60 05/01/2021    EGFRNONAA >60 05/01/2021     Lab Results   Component Value Date    ALT 16 05/01/2021    AST 25 05/01/2021    ALKPHOS 66 05/01/2021    BILITOT 0.5 05/01/2021       Lab Results   Component Value Date    TSH 1.061 04/27/2021       Lab Results   Component Value Date    WBC 10.41 05/01/2021    HGB 12.1 05/01/2021    HCT 36.0 (L) 05/01/2021    MCV 87 05/01/2021     05/01/2021           STD  No results found for: HIV1X2, QJT50CULU  No results found for: RPR  No results found for: HAV, HEPAIGM, HEPBIGM, HEPBCAB, HBEAG, HEPCAB  Lab Results   Component Value Date    LABNGO Not Detected 02/01/2022    LABCHLA Not Detected 02/01/2022         Lipids  Lab Results   Component Value Date    CHOL 190 04/27/2021     Lab Results   Component Value Date    HDL 59 04/27/2021     Lab Results   Component Value Date    LDLCALC 120.6 04/27/2021     Lab Results   Component Value Date    TRIG 52 04/27/2021     Lab Results   Component Value Date    CHOLHDL 31.1 04/27/2021       DM  Lab Results   Component Value Date    HGBA1C 5.5 10/27/2021               Future Appointments   Date Time Provider Department Center   4/27/2022  3:30 PM LAB, [facility] [place]           Medication List with Changes/Refills   Changed and/or Refilled Medications    Modified Medication Previous Medication    ERGOCALCIFEROL  (ERGOCALCIFEROL) 50,000 UNIT CAP ergocalciferol (ERGOCALCIFEROL) 50,000 unit Cap       Take 1 capsule (50,000 Units total) by mouth every 7 days.    Take 1 capsule (50,000 Units total) by mouth every 7 days.       Disclaimer:  This note has been generated using voice-recognition software. There may be grammatical or spelling errors that have been missed during proof-reading

## 2022-04-28 ENCOUNTER — PATIENT MESSAGE (OUTPATIENT)
Dept: INTERNAL MEDICINE | Facility: CLINIC | Age: 27
End: 2022-04-28
Payer: COMMERCIAL

## 2022-04-28 LAB — RPR SER QL: NORMAL

## 2022-05-03 LAB
HCV AB SERPL QL IA: NEGATIVE
HIV 1+2 AB+HIV1 P24 AG SERPL QL IA: NEGATIVE

## 2022-07-13 ENCOUNTER — PATIENT MESSAGE (OUTPATIENT)
Dept: OBSTETRICS AND GYNECOLOGY | Facility: CLINIC | Age: 27
End: 2022-07-13
Payer: COMMERCIAL

## 2022-12-04 NOTE — PROGRESS NOTES
"[facility]   Physical Therapy Treatment Note     Name: Nae Escalante  Clinic Number: [id number]    Therapy Diagnosis:   Encounter Diagnoses   Name Primary?    Chronic right shoulder pain     Decreased ROM of right shoulder     Decreased strength      Physician: Paul Ventura MD    Visit Date: 2/4/2022  Physician Orders: PT Eval and Treat  Medical Diagnosis from Referral: M75.111 (ICD-10-CM) - Nontraumatic incomplete tear of right rotator cuff  Evaluation Date: 1/14/2022  Authorization Period Expiration: 12/31/2022  Plan of Care Expiration: 3/11/2022  Visit # / Visits authorized: 3/60  FOTO: 3/5  PTA Visit: 0/6    Time In: 1440  Time Out: 1530  Total Billable Time: 45 minutes (3 TE)    SUBJECTIVE     Pt reports: no new complaints.  Trying to gradually use her shoulder more.  Low grade shoulder pain at all times.  Sleeping improving. Still pain and fearful with reaching overhead.     She was compliant with home exercise program.  Response to previous treatment: no adverse effects.   Functional change: ongoing    Pain: 4/10  Location: right shoulder      OBJECTIVE     Objective Measures updated at progress report unless specified.      Treatment     Nae received the treatments listed below:    Therapeutic exercises to develop strength, endurance, ROM, flexibility, posture and core stabilization for 40 minutes including:    Proprioceptive R shoulder GHJ positioning with scap squeeze 10x5"     R shoulder extension 2x10    Rhythmic perturbations  Flexion 90 degrees -> 5 rounds      ER/IR various angles --> 5 rounds  Reversal of agonists  ER/IR various angles --> 3 rounds    Serratus punches  3x15 3# dumbbell   Shoulder taps    2'x1    Not today:  S/L ER 2x10  Shoulder isometrics for ER/abduction: 5x6'' holds each, R  Table slides: 20x R  Wall slides: 10x R  Quad push-plus 10x  Quad rock backs 10x   IR/ER walkouts YTB 10x ea    manual therapy techniques: Joint mobilizations were " applied to the: R shoulder for 5 minutes, including:  Shoulder passive physiological flexion, ER, IR grade II-III      Patient Education and Home Exercises     Home Exercises Provided and Patient Education Provided     Education provided:   - cont HEP    Written Home Exercises Provided: yes. Exercises were reviewed and Nae was able to demonstrate them prior to the end of the session.  Nae demonstrated good  understanding of the education provided. See EMR under Patient Instructions for exercises provided during therapy sessions    ASSESSMENT   Nae is a 27 y/o F with R shoulder pain.  History of competitive gymnastics.  She has been compliant with her HEP and holding UE gym resistance training at this time.  Moderate shoulder irritability. Improving night pain and resting pain but still having pain with guarding with attempts at overhead reaching.  Anterior instability seems to be a part of her clinical picture at this time.  Focus of session today as well as previous sessions on improving RC recruitment in gravity eliminated positions and scapular stability program.  Educated again of pain versus soreness response to interventions.  Follow pain monitoring model for graded PRE and joint loading.       Nae Is progressing well towards her goals.   Pt prognosis is Excellent.     Pt will continue to benefit from skilled outpatient physical therapy to address the deficits listed in the problem list box on initial evaluation, provide pt/family education and to maximize pt's level of independence in the home and community environment.   Pt's spiritual, cultural and educational needs considered and pt agreeable to plan of care and goals.     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 Report decreased R shoulder pain </= 2/10 at rest and during low grade shoulder activito increase tolerance for ADLs and increased QoL. Progressing  towards; not met  2. Increase R shoulder PROM by 20 degrees in flexion and external rotation for increased functional mobility.  Progressing towards; not met  3. Increased strength by 1/3 MMT grade in R shoulder to increase tolerance for ADL and work activities.  Progressing towards; not met  4. Pt to tolerate HEP to improve ROM and independence with ADL's.  Progressing towards; not met     Long Term Goals: 8 weeks  1. Report decreased R shoulder pain </= 1/10 for overhead activities to increase tolerance for ADLs and increased QoL.  Progressing towards; not met  2. Increase R shoulder AROM to 180 degrees of active abduction and flexion for increased functional mobility and independent ADLs with decreased pain. Progressing towards; not met  3. Increase strength to >/= 4+/5 in BUE to increase tolerance for ADL and work activities. Progressing towards; not met  4. Pt goal: to reduce my shoulder pain and raise my arm without difficulty. Progressing towards; not met   5. Pt will have improved to score of </= 27% limited on shoulder FOTO in order to demonstrate true functional improvement. Progressing towards; not met  6. Pt will tests negative and/or very mild positive testing for R shoulder rotator cuff and labral special tests.  Progressing towards; not met    PLAN     Cont POC shoulder stability, RTC strengthening    Tawanda Wilson, PT, DPT, OCS     10

## 2023-03-15 ENCOUNTER — OFFICE VISIT (OUTPATIENT)
Dept: OBSTETRICS AND GYNECOLOGY | Facility: CLINIC | Age: 28
End: 2023-03-15
Payer: COMMERCIAL

## 2023-03-15 VITALS
SYSTOLIC BLOOD PRESSURE: 130 MMHG | BODY MASS INDEX: 31.67 KG/M2 | WEIGHT: 178.81 LBS | DIASTOLIC BLOOD PRESSURE: 82 MMHG

## 2023-03-15 DIAGNOSIS — Z01.419 WELL WOMAN EXAM WITH ROUTINE GYNECOLOGICAL EXAM: Primary | ICD-10-CM

## 2023-03-15 DIAGNOSIS — Z11.3 SCREENING FOR STD (SEXUALLY TRANSMITTED DISEASE): ICD-10-CM

## 2023-03-15 DIAGNOSIS — Z12.4 SCREENING FOR CERVICAL CANCER: ICD-10-CM

## 2023-03-15 DIAGNOSIS — Z30.09 ENCOUNTER FOR OTHER GENERAL COUNSELING OR ADVICE ON CONTRACEPTION: ICD-10-CM

## 2023-03-15 PROCEDURE — 87591 N.GONORRHOEAE DNA AMP PROB: CPT | Performed by: OBSTETRICS & GYNECOLOGY

## 2023-03-15 PROCEDURE — 1160F PR REVIEW ALL MEDS BY PRESCRIBER/CLIN PHARMACIST DOCUMENTED: ICD-10-PCS | Mod: CPTII,S$GLB,, | Performed by: OBSTETRICS & GYNECOLOGY

## 2023-03-15 PROCEDURE — 3008F PR BODY MASS INDEX (BMI) DOCUMENTED: ICD-10-PCS | Mod: CPTII,S$GLB,, | Performed by: OBSTETRICS & GYNECOLOGY

## 2023-03-15 PROCEDURE — 99395 PREV VISIT EST AGE 18-39: CPT | Mod: S$GLB,,, | Performed by: OBSTETRICS & GYNECOLOGY

## 2023-03-15 PROCEDURE — 99395 PR PREVENTIVE VISIT,EST,18-39: ICD-10-PCS | Mod: S$GLB,,, | Performed by: OBSTETRICS & GYNECOLOGY

## 2023-03-15 PROCEDURE — 3075F SYST BP GE 130 - 139MM HG: CPT | Mod: CPTII,S$GLB,, | Performed by: OBSTETRICS & GYNECOLOGY

## 2023-03-15 PROCEDURE — 1160F RVW MEDS BY RX/DR IN RCRD: CPT | Mod: CPTII,S$GLB,, | Performed by: OBSTETRICS & GYNECOLOGY

## 2023-03-15 PROCEDURE — 3079F DIAST BP 80-89 MM HG: CPT | Mod: CPTII,S$GLB,, | Performed by: OBSTETRICS & GYNECOLOGY

## 2023-03-15 PROCEDURE — 3075F PR MOST RECENT SYSTOLIC BLOOD PRESS GE 130-139MM HG: ICD-10-PCS | Mod: CPTII,S$GLB,, | Performed by: OBSTETRICS & GYNECOLOGY

## 2023-03-15 PROCEDURE — 99999 PR PBB SHADOW E&M-EST. PATIENT-LVL III: ICD-10-PCS | Mod: PBBFAC,,, | Performed by: OBSTETRICS & GYNECOLOGY

## 2023-03-15 PROCEDURE — 3008F BODY MASS INDEX DOCD: CPT | Mod: CPTII,S$GLB,, | Performed by: OBSTETRICS & GYNECOLOGY

## 2023-03-15 PROCEDURE — 99999 PR PBB SHADOW E&M-EST. PATIENT-LVL III: CPT | Mod: PBBFAC,,, | Performed by: OBSTETRICS & GYNECOLOGY

## 2023-03-15 PROCEDURE — 1159F PR MEDICATION LIST DOCUMENTED IN MEDICAL RECORD: ICD-10-PCS | Mod: CPTII,S$GLB,, | Performed by: OBSTETRICS & GYNECOLOGY

## 2023-03-15 PROCEDURE — 1159F MED LIST DOCD IN RCRD: CPT | Mod: CPTII,S$GLB,, | Performed by: OBSTETRICS & GYNECOLOGY

## 2023-03-15 PROCEDURE — 88175 CYTOPATH C/V AUTO FLUID REDO: CPT | Performed by: OBSTETRICS & GYNECOLOGY

## 2023-03-15 PROCEDURE — 3079F PR MOST RECENT DIASTOLIC BLOOD PRESSURE 80-89 MM HG: ICD-10-PCS | Mod: CPTII,S$GLB,, | Performed by: OBSTETRICS & GYNECOLOGY

## 2023-03-15 NOTE — PROGRESS NOTES
GYNECOLOGY OFFICE NOTE    Reason for visit: annual    HPI: Pt is a 27 y.o.  female  who presents for annual. Menarche: 15. Cycle: Interval-  Q month, Duration- 6 days, Flow- normal (changing  1hr but never saturated- changes with restroom use  ), denies dysmenorrhea. She is sexually active.  She uses no method for contraception.  She does desire STI screening. She denies vaginal discharge.  Last pap: 2021, denies hx of abnormal. The use of hormonal contraception has been fully discussed with the patient. We discussed all options including OCPs, transdermal patches, vaginal ring, Depo Provera injections, Nexplanon, and IUD. Warnings about anticipated minor side effects such as breakthrough spotting, nausea, breast tenderness, weight changes, acne, headaches, etc were given.  She has been told of the more serious potential side effects such as MI, stroke, and deep vein thrombosis, all of which are very unlikely.  She has been asked to report any signs of such serious problems immediately. The need for additional protection, such as a condom, to prevent exposure to sexually transmitted diseases has also been discussed- the patient has been clearly reminded that no hormonal contraceptive method can protect her against diseases such as HIV and others. She understands and wishes to think about it.      History reviewed. No pertinent past medical history.    History reviewed. No pertinent surgical history.    Family History   Problem Relation Age of Onset    Hyperlipidemia Mother     Hypertension Mother     Diabetes Father     Prostate cancer Father     Breast cancer Maternal Aunt     Colon cancer Neg Hx        Social History     Tobacco Use    Smoking status: Never    Smokeless tobacco: Never   Substance Use Topics    Alcohol use: Never    Drug use: Never       OB History    Para Term  AB Living   0 0 0 0 0 0   SAB IAB Ectopic Multiple Live Births   0 0 0 0 0       Current Outpatient  Medications   Medication Sig    ergocalciferol (ERGOCALCIFEROL) 50,000 unit Cap Take 1 capsule (50,000 Units total) by mouth every 7 days. (Patient not taking: Reported on 3/15/2023)     No current facility-administered medications for this visit.       Allergies: Patient has no known allergies.     /82   Wt 81.1 kg (178 lb 12.7 oz)   LMP 03/01/2023 (Exact Date)   BMI 31.67 kg/m²     ROS:  GENERAL: Denies fever or chills.   SKIN: Denies rash or lesions.   HEAD: Denies head injury or headache.   CHEST: Denies chest pain or shortness of breath.   CARDIOVASCULAR: Denies palpitations or chest pain.   ABDOMEN: No constipation, diarrhea, nausea, vomiting or rectal bleeding.   URINARY: No dysuria, hematuria, or burning on urination.  REPRODUCTIVE: See HPI.   BREASTS: see HPI  NEUROLOGIC: Denies syncope or weakness.     Physical Exam:  GENERAL: alert, appears stated age and cooperative  NEUROLOGIC: orientated to person, place and time, normal mood and affect   CHEST: Normal respiratory effort  NECK: normal appearance  SKIN: no acne, hirsutism  BREAST EXAM: breasts appear normal, no suspicious masses, no skin or nipple changes or axillary nodes  ABDOMEN: abdomen is soft without significant tenderness, masses  EXTERNAL GENITALIA:  normal general appearance  URETHRA: normal urethra, normal urethral meatus  VAGINA:  normal mucosa, no  lesions  CERVIX:  Normal  UTERUS:  mobile, non tender  ADNEXA: nontender    Diagnosis:  1. Well woman exam with routine gynecological exam    2. Screening for cervical cancer    3. Encounter for other general counseling or advice on contraception    4. Screening for STD (sexually transmitted disease)        Plan:   1. Annual  2. Pap today  3. Discussed options will let me know  4. F/u gc/ct     Orders Placed This Encounter    C. trachomatis/N. gonorrhoeae by AMP DNA [facility]; Cervix    Liquid-Based Pap Smear, Screening           Cortney Jackson MD  OB/GYN

## 2023-03-17 LAB
C TRACH DNA SPEC QL NAA+PROBE: NOT DETECTED
N GONORRHOEA DNA SPEC QL NAA+PROBE: NOT DETECTED

## 2023-03-20 ENCOUNTER — OFFICE VISIT (OUTPATIENT)
Dept: INTERNAL MEDICINE | Facility: CLINIC | Age: 28
End: 2023-03-20
Payer: COMMERCIAL

## 2023-03-20 VITALS
BODY MASS INDEX: 32.58 KG/M2 | SYSTOLIC BLOOD PRESSURE: 120 MMHG | DIASTOLIC BLOOD PRESSURE: 86 MMHG | HEIGHT: 63 IN | WEIGHT: 183.88 LBS

## 2023-03-20 DIAGNOSIS — Z13.1 SCREENING FOR DIABETES MELLITUS: ICD-10-CM

## 2023-03-20 DIAGNOSIS — Z00.00 PREVENTATIVE HEALTH CARE: Primary | ICD-10-CM

## 2023-03-20 DIAGNOSIS — Z13.220 SCREENING FOR HYPERLIPIDEMIA: ICD-10-CM

## 2023-03-20 DIAGNOSIS — Z23 NEED FOR VACCINATION: ICD-10-CM

## 2023-03-20 DIAGNOSIS — E66.09 CLASS 1 OBESITY DUE TO EXCESS CALORIES WITHOUT SERIOUS COMORBIDITY WITH BODY MASS INDEX (BMI) OF 32.0 TO 32.9 IN ADULT: ICD-10-CM

## 2023-03-20 PROCEDURE — 99395 PREV VISIT EST AGE 18-39: CPT | Mod: 25,S$GLB,, | Performed by: INTERNAL MEDICINE

## 2023-03-20 PROCEDURE — 3074F PR MOST RECENT SYSTOLIC BLOOD PRESSURE < 130 MM HG: ICD-10-PCS | Mod: CPTII,S$GLB,, | Performed by: INTERNAL MEDICINE

## 2023-03-20 PROCEDURE — 3079F PR MOST RECENT DIASTOLIC BLOOD PRESSURE 80-89 MM HG: ICD-10-PCS | Mod: CPTII,S$GLB,, | Performed by: INTERNAL MEDICINE

## 2023-03-20 PROCEDURE — 1160F PR REVIEW ALL MEDS BY PRESCRIBER/CLIN PHARMACIST DOCUMENTED: ICD-10-PCS | Mod: CPTII,S$GLB,, | Performed by: INTERNAL MEDICINE

## 2023-03-20 PROCEDURE — 99395 PR PREVENTIVE VISIT,EST,18-39: ICD-10-PCS | Mod: 25,S$GLB,, | Performed by: INTERNAL MEDICINE

## 2023-03-20 PROCEDURE — 3074F SYST BP LT 130 MM HG: CPT | Mod: CPTII,S$GLB,, | Performed by: INTERNAL MEDICINE

## 2023-03-20 PROCEDURE — 99999 PR PBB SHADOW E&M-EST. PATIENT-LVL III: CPT | Mod: PBBFAC,,, | Performed by: INTERNAL MEDICINE

## 2023-03-20 PROCEDURE — 3079F DIAST BP 80-89 MM HG: CPT | Mod: CPTII,S$GLB,, | Performed by: INTERNAL MEDICINE

## 2023-03-20 PROCEDURE — 1159F PR MEDICATION LIST DOCUMENTED IN MEDICAL RECORD: ICD-10-PCS | Mod: CPTII,S$GLB,, | Performed by: INTERNAL MEDICINE

## 2023-03-20 PROCEDURE — 99999 PR PBB SHADOW E&M-EST. PATIENT-LVL III: ICD-10-PCS | Mod: PBBFAC,,, | Performed by: INTERNAL MEDICINE

## 2023-03-20 PROCEDURE — 3008F PR BODY MASS INDEX (BMI) DOCUMENTED: ICD-10-PCS | Mod: CPTII,S$GLB,, | Performed by: INTERNAL MEDICINE

## 2023-03-20 PROCEDURE — 1159F MED LIST DOCD IN RCRD: CPT | Mod: CPTII,S$GLB,, | Performed by: INTERNAL MEDICINE

## 2023-03-20 PROCEDURE — 1160F RVW MEDS BY RX/DR IN RCRD: CPT | Mod: CPTII,S$GLB,, | Performed by: INTERNAL MEDICINE

## 2023-03-20 PROCEDURE — 3008F BODY MASS INDEX DOCD: CPT | Mod: CPTII,S$GLB,, | Performed by: INTERNAL MEDICINE

## 2023-03-20 NOTE — PROGRESS NOTES
"Assessment:       1. Preventative health care    2. Class 1 obesity due to excess calories without serious comorbidity with body mass index (BMI) of 32.0 to 32.9 in adult    3. Need for vaccination    4. Screening for diabetes mellitus    5. Screening for hyperlipidemia          Plan:         Nae was seen today for follow-up.    Diagnoses and all orders for this visit:    Preventative health care  -     Basic Metabolic Panel; Future  -     CBC Auto Differential; Future  -     Hemoglobin A1C; Future  -     Hepatic Function Panel; Future  -     Lipid Panel; Future  -     TSH; Future  -     CBC Auto Differential; Standing  -     TSH; Standing  -     Hepatic Function Panel; Standing  -     Hemoglobin A1C; Standing  -     Basic Metabolic Panel; Standing    Class 1 obesity due to excess calories without serious comorbidity with body mass index (BMI) of 32.0 to 32.9 in adult    Need for vaccination    Screening for diabetes mellitus  -     Hemoglobin A1C; Future  -     Hemoglobin A1C; Standing    Screening for hyperlipidemia  -     Lipid Panel; Future  -     Lipid Panel; Standing          Subjective:       Patient ID: Nae Escalante is a 27 y.o. female.    Chief Complaint: Follow-up      Interim Hx  Obgyn - talked about depo shots     Concerns today    Chronic problems     Walking at [employer] and home workout, Weight lifting ,       HPI    Review of Systems          Health Maintenance Due   Topic Date Due    COVID-19 Vaccine (4 - Booster for Pfizer series) 12/09/2021         Objective:     /86 (BP Location: Right arm, Patient Position: Sitting, BP Method: Large (Manual))   Ht 5' 3" (1.6 m)   Wt 83.4 kg (183 lb 13.8 oz)   LMP 03/01/2023 (Exact Date)   BMI 32.57 kg/m²     Vitals 3/20/2023 3/15/2023 4/27/2022 2/1/2022 1/27/2022   Height 63 - 63 - 63   Weight (lbs) 183.86 178.79 173.28 170 170   BMI (kg/m2) 32.6 - 30.7 - 30.1              Physical Exam        Future Appointments   Date Time Provider Department " [place]   3/21/2023 11:00 AM LAB, [facility] [place]   3/20/2024  7:00 AM [facility], [facility] [place]   3/22/2024  1:40 PM Anirudh Wilson III, MD [facility] [place]         Medication List with Changes/Refills   Current Medications    ERGOCALCIFEROL (ERGOCALCIFEROL) 50,000 UNIT CAP    Take 1 capsule (50,000 Units total) by mouth every 7 days.         Disclaimer:  This note has been generated using voice-recognition software. There may be grammatical or spelling errors that have been missed during proof-reading

## 2023-03-21 ENCOUNTER — LAB VISIT (OUTPATIENT)
Dept: LAB | Facility: HOSPITAL | Age: 28
End: 2023-03-21
Attending: INTERNAL MEDICINE
Payer: COMMERCIAL

## 2023-03-21 DIAGNOSIS — Z13.1 SCREENING FOR DIABETES MELLITUS: ICD-10-CM

## 2023-03-21 DIAGNOSIS — Z13.220 SCREENING FOR HYPERLIPIDEMIA: ICD-10-CM

## 2023-03-21 DIAGNOSIS — Z00.00 PREVENTATIVE HEALTH CARE: ICD-10-CM

## 2023-03-21 LAB
ALBUMIN SERPL BCP-MCNC: 3.7 G/DL (ref 3.5–5.2)
ALP SERPL-CCNC: 60 U/L (ref 55–135)
ALT SERPL W/O P-5'-P-CCNC: 14 U/L (ref 10–44)
ANION GAP SERPL CALC-SCNC: 11 MMOL/L (ref 8–16)
AST SERPL-CCNC: 20 U/L (ref 10–40)
BASOPHILS # BLD AUTO: 0.02 K/UL (ref 0–0.2)
BASOPHILS NFR BLD: 0.3 % (ref 0–1.9)
BILIRUB DIRECT SERPL-MCNC: 0.1 MG/DL (ref 0.1–0.3)
BILIRUB SERPL-MCNC: 0.1 MG/DL (ref 0.1–1)
BUN SERPL-MCNC: 12 MG/DL (ref 6–20)
CALCIUM SERPL-MCNC: 9.1 MG/DL (ref 8.7–10.5)
CHLORIDE SERPL-SCNC: 104 MMOL/L (ref 95–110)
CHOLEST SERPL-MCNC: 219 MG/DL (ref 120–199)
CHOLEST/HDLC SERPL: 3.4 {RATIO} (ref 2–5)
CO2 SERPL-SCNC: 25 MMOL/L (ref 23–29)
CREAT SERPL-MCNC: 0.9 MG/DL (ref 0.5–1.4)
DIFFERENTIAL METHOD: ABNORMAL
EOSINOPHIL # BLD AUTO: 0.1 K/UL (ref 0–0.5)
EOSINOPHIL NFR BLD: 2 % (ref 0–8)
ERYTHROCYTE [DISTWIDTH] IN BLOOD BY AUTOMATED COUNT: 14.5 % (ref 11.5–14.5)
EST. GFR  (NO RACE VARIABLE): >60 ML/MIN/1.73 M^2
ESTIMATED AVG GLUCOSE: 120 MG/DL (ref 68–131)
GLUCOSE SERPL-MCNC: 97 MG/DL (ref 70–110)
HBA1C MFR BLD: 5.8 % (ref 4–5.6)
HCT VFR BLD AUTO: 32.3 % (ref 37–48.5)
HDLC SERPL-MCNC: 64 MG/DL (ref 40–75)
HDLC SERPL: 29.2 % (ref 20–50)
HGB BLD-MCNC: 10.3 G/DL (ref 12–16)
IMM GRANULOCYTES # BLD AUTO: 0.02 K/UL (ref 0–0.04)
IMM GRANULOCYTES NFR BLD AUTO: 0.3 % (ref 0–0.5)
LDLC SERPL CALC-MCNC: 143.2 MG/DL (ref 63–159)
LYMPHOCYTES # BLD AUTO: 2.7 K/UL (ref 1–4.8)
LYMPHOCYTES NFR BLD: 44.5 % (ref 18–48)
MCH RBC QN AUTO: 28.7 PG (ref 27–31)
MCHC RBC AUTO-ENTMCNC: 31.9 G/DL (ref 32–36)
MCV RBC AUTO: 90 FL (ref 82–98)
MONOCYTES # BLD AUTO: 0.5 K/UL (ref 0.3–1)
MONOCYTES NFR BLD: 8.3 % (ref 4–15)
NEUTROPHILS # BLD AUTO: 2.7 K/UL (ref 1.8–7.7)
NEUTROPHILS NFR BLD: 44.6 % (ref 38–73)
NONHDLC SERPL-MCNC: 155 MG/DL
NRBC BLD-RTO: 0 /100 WBC
PLATELET # BLD AUTO: 400 K/UL (ref 150–450)
PMV BLD AUTO: 9.7 FL (ref 9.2–12.9)
POTASSIUM SERPL-SCNC: 3.9 MMOL/L (ref 3.5–5.1)
PROT SERPL-MCNC: 7.4 G/DL (ref 6–8.4)
RBC # BLD AUTO: 3.59 M/UL (ref 4–5.4)
SODIUM SERPL-SCNC: 140 MMOL/L (ref 136–145)
TRIGL SERPL-MCNC: 59 MG/DL (ref 30–150)
TSH SERPL DL<=0.005 MIU/L-ACNC: 2.1 UIU/ML (ref 0.4–4)
WBC # BLD AUTO: 6.11 K/UL (ref 3.9–12.7)

## 2023-03-21 PROCEDURE — 36415 COLL VENOUS BLD VENIPUNCTURE: CPT | Mod: PO | Performed by: INTERNAL MEDICINE

## 2023-03-21 PROCEDURE — 83036 HEMOGLOBIN GLYCOSYLATED A1C: CPT | Performed by: INTERNAL MEDICINE

## 2023-03-21 PROCEDURE — 80076 HEPATIC FUNCTION PANEL: CPT | Performed by: INTERNAL MEDICINE

## 2023-03-21 PROCEDURE — 80048 BASIC METABOLIC PNL TOTAL CA: CPT | Performed by: INTERNAL MEDICINE

## 2023-03-21 PROCEDURE — 80061 LIPID PANEL: CPT | Performed by: INTERNAL MEDICINE

## 2023-03-21 PROCEDURE — 84443 ASSAY THYROID STIM HORMONE: CPT | Performed by: INTERNAL MEDICINE

## 2023-03-21 PROCEDURE — 85025 COMPLETE CBC W/AUTO DIFF WBC: CPT | Performed by: INTERNAL MEDICINE

## 2023-03-23 LAB
FINAL PATHOLOGIC DIAGNOSIS: NORMAL
Lab: NORMAL

## 2023-07-11 ENCOUNTER — PATIENT MESSAGE (OUTPATIENT)
Dept: RESEARCH | Facility: HOSPITAL | Age: 28
End: 2023-07-11
Payer: COMMERCIAL

## 2024-01-31 ENCOUNTER — OFFICE VISIT (OUTPATIENT)
Dept: FAMILY MEDICINE | Facility: CLINIC | Age: 29
End: 2024-01-31
Payer: COMMERCIAL

## 2024-01-31 DIAGNOSIS — J10.1 UPPER RESPIRATORY TRACT INFECTION DUE TO INFLUENZA A VIRUS: ICD-10-CM

## 2024-01-31 DIAGNOSIS — R05.1 ACUTE COUGH: Primary | ICD-10-CM

## 2024-01-31 LAB
CTP QC/QA: YES
CTP QC/QA: YES
FLUAV AG NPH QL: POSITIVE
FLUBV AG NPH QL: NEGATIVE
SARS-COV-2 RDRP RESP QL NAA+PROBE: NEGATIVE

## 2024-01-31 PROCEDURE — 1160F RVW MEDS BY RX/DR IN RCRD: CPT | Mod: CPTII,95,, | Performed by: FAMILY MEDICINE

## 2024-01-31 PROCEDURE — 87635 SARS-COV-2 COVID-19 AMP PRB: CPT | Mod: QW,NDTC,, | Performed by: FAMILY MEDICINE

## 2024-01-31 PROCEDURE — 1159F MED LIST DOCD IN RCRD: CPT | Mod: CPTII,95,, | Performed by: FAMILY MEDICINE

## 2024-01-31 PROCEDURE — 99214 OFFICE O/P EST MOD 30 MIN: CPT | Mod: 95,,, | Performed by: FAMILY MEDICINE

## 2024-01-31 PROCEDURE — 87804 INFLUENZA ASSAY W/OPTIC: CPT | Mod: 59,QW,NDTC, | Performed by: FAMILY MEDICINE

## 2024-01-31 RX ORDER — OSELTAMIVIR PHOSPHATE 75 MG/1
75 CAPSULE ORAL 2 TIMES DAILY
Qty: 10 CAPSULE | Refills: 0 | Status: SHIPPED | OUTPATIENT
Start: 2024-01-31 | End: 2024-02-05

## 2024-01-31 NOTE — PROGRESS NOTES
Virtual Video Visits     Subjective:       Patient ID: Nae Escalante is a 28 y.o. female.    Chief Complaint: Cough  29 yo female presents c/o cough and wheezing. My lungs feel like they are on fire. Symptoms for the past 2-3 days.  Cough  This is a new problem. The current episode started yesterday. The problem has been rapidly worsening. The problem occurs every few minutes. The cough is Non-productive and productive of purulent sputum. Associated symptoms include chest pain, chills, nasal congestion, postnasal drip, rhinorrhea, a sore throat, shortness of breath and wheezing. Pertinent negatives include no ear congestion, ear pain, fever, headaches, heartburn, hemoptysis, myalgias, rash, sweats or weight loss. Nothing aggravates the symptoms. She has tried OTC cough suppressant for the symptoms. The treatment provided no relief. There is no history of asthma, bronchiectasis, bronchitis, COPD, emphysema, environmental allergies or pneumonia.   Has taken Delsym, Theraflu and Mucinex without relief of her symptoms. Bereket has flu like symptoms.  Review of Systems   Constitutional:  Positive for chills. Negative for fever and weight loss.   HENT:  Positive for postnasal drip, rhinorrhea and sore throat. Negative for ear pain.    Respiratory:  Positive for cough, shortness of breath and wheezing. Negative for hemoptysis.    Cardiovascular:  Positive for chest pain.   Gastrointestinal:  Negative for heartburn.   Musculoskeletal:  Negative for myalgias.   Skin:  Negative for rash.   Allergic/Immunologic: Negative for environmental allergies.   Neurological:  Negative for headaches.       Objective:      Physical Exam  Constitutional:       Appearance: Normal appearance.   HENT:      Head: Normocephalic and atraumatic.   Eyes:      Extraocular Movements: Extraocular movements intact.   Pulmonary:      Effort: Pulmonary effort is normal.      Breath sounds: Normal breath sounds. No wheezing or rales.   Neurological:       Mental Status: She is alert.       Pt came to the clinic for an exam and COVID / Flu testing  Assessment:       See plan  Plan:       Acute cough  -     POCT Influenza A/B Rapid Antigen: Positive for A  -     POCT COVID-19 Rapid Screening: Negative    Upper respiratory tract infection due to influenza A virus  -     oseltamivir (TAMIFLU) 75 MG capsule; Take 1 capsule (75 mg total) by mouth 2 (two) times daily. for 5 days  Dispense: 10 capsule; Refill: 0  - Continue symptomatic treatment  -Pt may return to work on 2/5/254.             The patient location is:  Patient Home   The chief complaint leading to consultation is: Cough  Visit type: Virtual visit with synchronous audio and video  Total time spent with patient: 20 minutes  Each patient to whom he or she provides medical services by telemedicine is:  (1) informed of the relationship between the physician and patient and the respective role of any other health care provider with respect to management of the patient; and (2) notified that he or she may decline to receive medical services by telemedicine and may withdraw from such care at any time.

## 2024-03-20 ENCOUNTER — LAB VISIT (OUTPATIENT)
Dept: LAB | Facility: HOSPITAL | Age: 29
End: 2024-03-20
Attending: INTERNAL MEDICINE
Payer: COMMERCIAL

## 2024-03-20 DIAGNOSIS — Z13.220 SCREENING FOR HYPERLIPIDEMIA: ICD-10-CM

## 2024-03-20 DIAGNOSIS — Z13.1 SCREENING FOR DIABETES MELLITUS: ICD-10-CM

## 2024-03-20 DIAGNOSIS — Z00.00 PREVENTATIVE HEALTH CARE: ICD-10-CM

## 2024-03-20 LAB
ALBUMIN SERPL BCP-MCNC: 3.8 G/DL (ref 3.5–5.2)
ALP SERPL-CCNC: 70 U/L (ref 55–135)
ALT SERPL W/O P-5'-P-CCNC: 14 U/L (ref 10–44)
ANION GAP SERPL CALC-SCNC: 8 MMOL/L (ref 8–16)
AST SERPL-CCNC: 18 U/L (ref 10–40)
BASOPHILS # BLD AUTO: 0.02 K/UL (ref 0–0.2)
BASOPHILS NFR BLD: 0.3 % (ref 0–1.9)
BILIRUB DIRECT SERPL-MCNC: 0.1 MG/DL (ref 0.1–0.3)
BILIRUB SERPL-MCNC: 0.2 MG/DL (ref 0.1–1)
BUN SERPL-MCNC: 14 MG/DL (ref 6–20)
CALCIUM SERPL-MCNC: 9.6 MG/DL (ref 8.7–10.5)
CHLORIDE SERPL-SCNC: 108 MMOL/L (ref 95–110)
CHOLEST SERPL-MCNC: 216 MG/DL (ref 120–199)
CHOLEST/HDLC SERPL: 3.2 {RATIO} (ref 2–5)
CO2 SERPL-SCNC: 22 MMOL/L (ref 23–29)
CREAT SERPL-MCNC: 1 MG/DL (ref 0.5–1.4)
DIFFERENTIAL METHOD BLD: ABNORMAL
EOSINOPHIL # BLD AUTO: 0.1 K/UL (ref 0–0.5)
EOSINOPHIL NFR BLD: 1.7 % (ref 0–8)
ERYTHROCYTE [DISTWIDTH] IN BLOOD BY AUTOMATED COUNT: 15.3 % (ref 11.5–14.5)
EST. GFR  (NO RACE VARIABLE): >60 ML/MIN/1.73 M^2
ESTIMATED AVG GLUCOSE: 111 MG/DL (ref 68–131)
GLUCOSE SERPL-MCNC: 112 MG/DL (ref 70–110)
HBA1C MFR BLD: 5.5 % (ref 4–5.6)
HCT VFR BLD AUTO: 34.3 % (ref 37–48.5)
HDLC SERPL-MCNC: 67 MG/DL (ref 40–75)
HDLC SERPL: 31 % (ref 20–50)
HGB BLD-MCNC: 11 G/DL (ref 12–16)
IMM GRANULOCYTES # BLD AUTO: 0.01 K/UL (ref 0–0.04)
IMM GRANULOCYTES NFR BLD AUTO: 0.2 % (ref 0–0.5)
LDLC SERPL CALC-MCNC: 141 MG/DL (ref 63–159)
LYMPHOCYTES # BLD AUTO: 2.2 K/UL (ref 1–4.8)
LYMPHOCYTES NFR BLD: 38.8 % (ref 18–48)
MCH RBC QN AUTO: 28.7 PG (ref 27–31)
MCHC RBC AUTO-ENTMCNC: 32.1 G/DL (ref 32–36)
MCV RBC AUTO: 90 FL (ref 82–98)
MONOCYTES # BLD AUTO: 0.5 K/UL (ref 0.3–1)
MONOCYTES NFR BLD: 9.4 % (ref 4–15)
NEUTROPHILS # BLD AUTO: 2.9 K/UL (ref 1.8–7.7)
NEUTROPHILS NFR BLD: 49.6 % (ref 38–73)
NONHDLC SERPL-MCNC: 149 MG/DL
NRBC BLD-RTO: 0 /100 WBC
PLATELET # BLD AUTO: 386 K/UL (ref 150–450)
PMV BLD AUTO: 9.9 FL (ref 9.2–12.9)
POTASSIUM SERPL-SCNC: 4.7 MMOL/L (ref 3.5–5.1)
PROT SERPL-MCNC: 7.6 G/DL (ref 6–8.4)
RBC # BLD AUTO: 3.83 M/UL (ref 4–5.4)
SODIUM SERPL-SCNC: 138 MMOL/L (ref 136–145)
TRIGL SERPL-MCNC: 40 MG/DL (ref 30–150)
TSH SERPL DL<=0.005 MIU/L-ACNC: 1.46 UIU/ML (ref 0.4–4)
WBC # BLD AUTO: 5.75 K/UL (ref 3.9–12.7)

## 2024-03-20 PROCEDURE — 85025 COMPLETE CBC W/AUTO DIFF WBC: CPT | Performed by: INTERNAL MEDICINE

## 2024-03-20 PROCEDURE — 80076 HEPATIC FUNCTION PANEL: CPT | Performed by: INTERNAL MEDICINE

## 2024-03-20 PROCEDURE — 84443 ASSAY THYROID STIM HORMONE: CPT | Performed by: INTERNAL MEDICINE

## 2024-03-20 PROCEDURE — 36415 COLL VENOUS BLD VENIPUNCTURE: CPT | Mod: PO | Performed by: INTERNAL MEDICINE

## 2024-03-20 PROCEDURE — 80061 LIPID PANEL: CPT | Performed by: INTERNAL MEDICINE

## 2024-03-20 PROCEDURE — 83036 HEMOGLOBIN GLYCOSYLATED A1C: CPT | Performed by: INTERNAL MEDICINE

## 2024-03-20 PROCEDURE — 80048 BASIC METABOLIC PNL TOTAL CA: CPT | Performed by: INTERNAL MEDICINE

## 2024-03-22 ENCOUNTER — LAB VISIT (OUTPATIENT)
Dept: LAB | Facility: HOSPITAL | Age: 29
End: 2024-03-22
Attending: INTERNAL MEDICINE
Payer: COMMERCIAL

## 2024-03-22 ENCOUNTER — OFFICE VISIT (OUTPATIENT)
Dept: INTERNAL MEDICINE | Facility: CLINIC | Age: 29
End: 2024-03-22
Payer: COMMERCIAL

## 2024-03-22 VITALS
HEART RATE: 93 BPM | WEIGHT: 200.63 LBS | SYSTOLIC BLOOD PRESSURE: 124 MMHG | BODY MASS INDEX: 35.55 KG/M2 | DIASTOLIC BLOOD PRESSURE: 78 MMHG | HEIGHT: 63 IN | OXYGEN SATURATION: 99 %

## 2024-03-22 DIAGNOSIS — M79.89 LEG SWELLING: ICD-10-CM

## 2024-03-22 DIAGNOSIS — T14.8XXA MUSCLE STRAIN: ICD-10-CM

## 2024-03-22 DIAGNOSIS — Z00.00 PREVENTATIVE HEALTH CARE: Primary | ICD-10-CM

## 2024-03-22 LAB
BNP SERPL-MCNC: <10 PG/ML (ref 0–99)
TSH SERPL DL<=0.005 MIU/L-ACNC: 1.07 UIU/ML (ref 0.4–4)

## 2024-03-22 PROCEDURE — 99999 PR PBB SHADOW E&M-EST. PATIENT-LVL IV: CPT | Mod: PBBFAC,,, | Performed by: INTERNAL MEDICINE

## 2024-03-22 PROCEDURE — 1160F RVW MEDS BY RX/DR IN RCRD: CPT | Mod: CPTII,S$GLB,, | Performed by: INTERNAL MEDICINE

## 2024-03-22 PROCEDURE — 83880 ASSAY OF NATRIURETIC PEPTIDE: CPT | Performed by: INTERNAL MEDICINE

## 2024-03-22 PROCEDURE — 84443 ASSAY THYROID STIM HORMONE: CPT | Performed by: INTERNAL MEDICINE

## 2024-03-22 PROCEDURE — 3074F SYST BP LT 130 MM HG: CPT | Mod: CPTII,S$GLB,, | Performed by: INTERNAL MEDICINE

## 2024-03-22 PROCEDURE — 99395 PREV VISIT EST AGE 18-39: CPT | Mod: 25,S$GLB,, | Performed by: INTERNAL MEDICINE

## 2024-03-22 PROCEDURE — 1159F MED LIST DOCD IN RCRD: CPT | Mod: CPTII,S$GLB,, | Performed by: INTERNAL MEDICINE

## 2024-03-22 PROCEDURE — 36415 COLL VENOUS BLD VENIPUNCTURE: CPT | Mod: PO | Performed by: INTERNAL MEDICINE

## 2024-03-22 PROCEDURE — 3044F HG A1C LEVEL LT 7.0%: CPT | Mod: CPTII,S$GLB,, | Performed by: INTERNAL MEDICINE

## 2024-03-22 PROCEDURE — 3078F DIAST BP <80 MM HG: CPT | Mod: CPTII,S$GLB,, | Performed by: INTERNAL MEDICINE

## 2024-03-22 PROCEDURE — 3008F BODY MASS INDEX DOCD: CPT | Mod: CPTII,S$GLB,, | Performed by: INTERNAL MEDICINE

## 2024-03-22 RX ORDER — METHOCARBAMOL 500 MG/1
1000 TABLET, FILM COATED ORAL 4 TIMES DAILY
Qty: 80 TABLET | Refills: 0 | Status: SHIPPED | OUTPATIENT
Start: 2024-03-22 | End: 2024-04-01

## 2024-03-22 NOTE — PROGRESS NOTES
Assessment:         1. Preventative health care    2. Leg swelling    3. Muscle strain          Plan:               Nae was seen today for annual exam.    Diagnoses and all orders for this visit:     Preventative health care    Leg swelling    New   Uncontrolled  Signs, symptoms consistent with venous insufficency   history or pyhsical exam do not suggest vte  DDx includes but not limited to baker cyst,   I provided instruction on supportive care measures   Prior tesing reviewed and new testing was was given  no change   reviewed signs and symptoms that should prompt return to provider or evaluation in the ED  -     TSH; Future  -     BNP; Future  -     Protein/Creatinine Ratio, Urine    Muscle strain  Signs, symptoms consistent with  neck stain  history or pyhsical exam do not suggest fracture, epidural abcess, c  DDx includes but not limited to spinal canal stenosis,  ddd,   I provided instruction on supportive care measures   Prior testing reviewed and new testing was was given  New medications as below    reviewed signs and symptoms that should prompt return to provider or evaluation in the ED  -     Ambulatory referral/consult to Physical/Occupational Therapy; Future  -     methocarbamoL (ROBAXIN) 500 MG Tab; Take 2 tablets (1,000 mg total) by mouth 4 (four) times daily. for 10 days        Leg swelling - labs and will try compression   Neck strain  - will try pt/ot, robaxin   Fu annual      Subjective:       Patient ID: Nae Escalante is a 28 y.o. female.    Chief Complaint: Annual Exam        Interim Hx  Got enganged in [place]  Was doing a lot of walking   Has noticed swelling of the leg since that time    Concerns today    Chronic problems     HPI    Review of Systems   All other systems reviewed and are negative.            Health Maintenance Due   Topic Date Due    COVID-19 Vaccine (4 - 2023-24 season) 09/01/2023         Objective:     /78 (BP Location: Right arm, Patient Position: Sitting,  "BP Method: Large (Manual))   Pulse 93   Ht 5' 3" (1.6 m)   Wt 91 kg (200 lb 9.9 oz)   LMP 03/01/2024   SpO2 99%   BMI 35.54 kg/m²         3/22/2024     1:27 PM 3/20/2023     1:46 PM 3/15/2023     1:14 PM 4/27/2022     2:47 PM 2/1/2022    11:36 PM   Vitals   Height 5' 3" (1.6 m) 5' 3" (1.6 m)  5' 3" (1.6 m)    Weight (lbs) 200.62 183.86 178.79 173.28 170   BMI (kg/m2) 35.5 32.6  30.7                 Physical Exam  Vitals and nursing note reviewed.   Constitutional:       General: She is not in acute distress.     Appearance: She is well-developed. She is not diaphoretic.   HENT:      Head: Normocephalic.      Nose: Nose normal.   Eyes:      General:         Right eye: No discharge.         Left eye: No discharge.      Conjunctiva/sclera: Conjunctivae normal.      Pupils: Pupils are equal, round, and reactive to light.   Cardiovascular:      Rate and Rhythm: Normal rate and regular rhythm.      Heart sounds: Normal heart sounds. No murmur heard.     No friction rub. No gallop.   Pulmonary:      Effort: Pulmonary effort is normal. No respiratory distress.   Abdominal:      General: Bowel sounds are normal. There is no distension.      Palpations: Abdomen is soft.   Musculoskeletal:         General: No deformity. Normal range of motion.      Cervical back: Normal range of motion.   Skin:     General: Skin is warm.   Neurological:      Mental Status: She is alert and oriented to person, place, and time.      Cranial Nerves: No cranial nerve deficit.               Future Appointments   Date Time Provider Department Center   4/4/2024  8:00 AM Paul Wu, [facility]   3/25/2025  2:20 PM [facility]                 Disclaimer:  This note has been generated using voice-recognition software. There may be grammatical or spelling errors that have been missed during proof-reading   "

## 2024-04-04 ENCOUNTER — CLINICAL SUPPORT (OUTPATIENT)
Dept: REHABILITATION | Facility: HOSPITAL | Age: 29
End: 2024-04-04
Attending: INTERNAL MEDICINE
Payer: COMMERCIAL

## 2024-04-04 DIAGNOSIS — T14.8XXA MUSCLE STRAIN: ICD-10-CM

## 2024-04-04 DIAGNOSIS — M53.82 DECREASED RANGE OF MOTION OF INTERVERTEBRAL DISCS OF CERVICAL SPINE: Primary | ICD-10-CM

## 2024-04-04 PROCEDURE — 97161 PT EVAL LOW COMPLEX 20 MIN: CPT | Mod: PN | Performed by: PHYSICAL THERAPIST

## 2024-04-04 PROCEDURE — 97014 ELECTRIC STIMULATION THERAPY: CPT | Mod: PN | Performed by: PHYSICAL THERAPIST

## 2024-04-04 PROCEDURE — 97140 MANUAL THERAPY 1/> REGIONS: CPT | Mod: PN | Performed by: PHYSICAL THERAPIST

## 2024-04-04 NOTE — PLAN OF CARE
[facility]  Physical Therapy Initial Evaluation    Name: Nae Escalante  Clinic Number: [id number]    Therapy Diagnosis:   Encounter Diagnoses   Name Primary?    Muscle strain     Decreased range of motion of intervertebral discs of cervical spine Yes     Physician: Anirudh Wilson III, MD    Physician Orders: PT Eval and Treat   Medical Diagnosis: T14.8XXA (ICD-10-CM) - Muscle strain   Evaluation Date: 4/4/2024  Authorization Period Expiration: 3/22/25  Plan of Care Expiration: 5/3/24  Progress Note Due: 5/3/24  Visit # / Visits authorized: 1/ 1  FOTO: 1/5  PTA: 0/6    Time In: 8:01 am  Time Out: 9:00 am  Total Billable Time: 58 minutes (LCE, Mtx2, e-stim)     Precautions: Standard    SUBJECTIVE   Date of onset: ~ 1 month ago    History of current condition - Nae reports: she's been getting headaches. Sometimes the headaches are mild from looking at a computer. 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 Headaches usually initiate from posterior head to front and is bilateral. She denies aura. Headaches are mostly mild, but can get to moderate. Most headaches at the end of the day (7-8 pm after gymnastics). She denies photophobia or phonophobia. She denies tingling/numbness. Her upper trap seems worse on her left. She's been referred to [facility] for PT.     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  Nae Escalante  has no past surgical history on file.    Nae currently has no medications in their medication list.    Review of patient's allergies indicates:  No Known Allergies  "    Pain:  Current 0/10, worst 7/10, best 0/10   Location: bilateral head or neck  Constant or intermittent? intermittent  Description: dull   Aggravating Factors: looking at computer, gymnastics  Easing Factors: ibuprofen (reduces in about 1 hr), hot shower, self massage/massage chair, acupressure with softball    Nausea? No  Photophobia? No  Phonophobia? No    Pts goals: concentrate better, dec pain, less pain with lifting (gymnastics)    OBJECTIVE     Palpation: mild inc tender to palpation right upper trap, suboccipitals, levator scap    Posture:  Cervical: mild forward head.     Gross Movement:  -Gait: WNL    Cervicothoracic Range of Motion: [] = normal ranges   Degrees Pain/Dysfunction   Flexion [40 deg] 60 Post neck pressure   Extension [50 deg] 46 NP   Right Rotation [50 deg] 68 NP   Left Rotation [50 deg] 68 NP   Right Side Bending [22 deg] 58 Contralateral strain (more)   Left Side Bending [22 deg] 50 Contralateral strain       Upper Extremity ROM Strength:  LUE AROM MMT RUE AROM MMT   Shoulder flexion: WNL 5/5 Shoulder flexion: WNL 5/5   Shoulder Abduction: WNL 5/5 Shoulder abduction: WNL 5/5   Shoulder ER WNL 5/5 Shoulder ER WNL 5/5   Shoulder IR WNL 5/5 Shoulder IR WNL 5/5     5/5 :  5/5       Special Tests:  Cervical Radiculopathy Cluster:  - Cervical distraction: +  - Spurling's test: -  - Cervical rotation < 60 degree: -  - ULTT1: -      Other tests:  - Median nerve tension test: (-)  - Ulnar nerve tension test: (-)  - Radial nerve tension test: (-)  - Cervical flexion/rotation test: + right   - Sharp's Purcer: (-)  - Alar Ligament: (-)  - Cervical compression: (-)  - Deep Cervical Neck Flexor Test: 20 seconds   * Norms: Male 38.9 sec; Female 29.4 seconds  - Posterior neck extensor test: > 1 minutes    * norm = 20"     Flexibility:   - Pec Major: -  - Pec Minor: -  - Upper trap: -   - Levator scapular: -   - Anterior scalene: +     Sensation: Normal light touch sensation C4-T2 throughout " "JAVAN      CMS Impairment/Limitation/Restriction for FOTO Neck Survey    Therapist reviewed FOTO scores for Nae Escalante on 4/4/2024.   FOTO documents entered into EPIC - see Media section.    Limitation Score:   Intake: 69%  Predicted: 77%       TREATMENT     Total Treatment time (time-based codes) separate from Evaluation: 29 minutes (Mtx2, e-stim)     Nae received therapeutic exercises to develop strength, endurance, ROM, flexibility, posture, and core stabilization for 4 minutes including:  Home exercise program:  Anterior scalene stretch  Cervical retraction    Next:  Deep neck flexor nod: 10x10" holds  Deep neck flexor nod with liftoff: 10x6" holds  Prone scapular retraction with Bilateral shoulder extension holds: 2x30" holds  Prone scapular retraction with T holds: 3x15" holds    Nae received the following manual therapy techniques: Joint mobilizations, Manual traction, Myofacial release, and Soft tissue Mobilization were applied to the: neck for 25 minutes:    Bilateral C2 UPA in supine  Manual intermittent cervical distraction  SOR  Right upper cervical rotation mobs/PROM    See EMR under MEDIA for written consent provided.    Palpation Assessment to determine the necessity for Functional Dry Needling  right cervicogenic headache.     Nae received the following supervised modalities after being cleared for contradictions: dry needling: Nae received TENS electrical stimulation for pain to the right suboccipitals. Electrical stimulation was applied to these needles at 4 Hz and a pulse of 250 µseconds for 10 minutes with a correction check. Nae reported treatment well without any adverse effects.     neuromuscular re-education activities to improve: Balance, Kinesthetic, Proprioception, and Posture for 0 minutes. The following activities were included:  Not today    therapeutic activities to improve functional performance for 0  minutes, including:  Not today      PATIENT EDUCATION AND HOME " EXERCISES     Education provided:   - anatomy  - importance of home exercise program compliance  - attendance policy  - risks and benefits of dry needling - consent signed    Written Home Exercises Provided: yes. Exercises were reviewed and Nae was able to demonstrate them prior to the end of the session.  Nae demonstrated good  understanding of the education provided. See EMR under Patient Instructions for exercises provided during therapy sessions.    ASSESSMENT   Nae is a 29 y.o. female referred to outpatient Physical Therapy with a medical diagnosis of muscle strain. Pt presents with right > left sided headaches occurring 2x per wk for about 1 month. Pain radiates from occiput to frontal area. + right cervical flexion rotation test and deep neck flexor endurance tests. Pain provocation at right > left C2 with improve symptoms with UPA. Symptoms affect concentration, work as a  and sleeping.   Nae was agreeable to dry needling by a certified dry needling PT and signed a consent prior to treatment after being made aware of risks. 4x25 mm needles were placed into splenius capitis, longissimus capitis, oblique capitus superior and semispinalis capitis. Needles were winded for grasp. Electrical stimulation was applied to these needles at 4 Hz and a pulse of 250 µseconds for 10 minutes with a [facility] check. Afterwards, needles were removed and placed into a sharps container. Nae reported a good response to treatment.   She is appropriate for skilled PT to help her reach her goals.    Pt prognosis is Excellent.   Pt will benefit from skilled outpatient Physical Therapy to address the deficits stated above and in the chart below, provide pt/family education, and to maximize pt's level of independence.     Plan of care discussed with patient: yes  Pt's spiritual, cultural and educational needs considered and pt agreeable to plan of care and goals as stated below:     Anticipated Barriers for  "therapy: none    Medical Necessity is demonstrated by the following  History  Co-morbidities and personal factors that may impact the plan of care Co-morbidities:   none    Personal Factors:   no deficits     low   Examination  Body Structures and Functions, activity limitations and participation restrictions that may impact the plan of care Body Regions:   neck    Body Systems:    ROM  strength  motor control  motor learning    Participation Restrictions:   See above    Activity limitations:   Learning and applying knowledge  No deficits    General Tasks and Commands  No deficits    Communication  No deficits    Mobility  lifting and carrying objects    Self care  no deficits    Domestic Life  assisting others    Interactions/Relationships  no deficits    Life Areas  employment    Community and Social Life  community life  recreation and leisure         moderate     Clinical Presentation stable and uncomplicated low   Decision Making/ Complexity Score: low     Goals:  Short Term Goals: 3-4 weeks:  1. Patient will demonstrate negative cervical flexion/rotation test to the right.   2. Patient will demonstrate deep neck flexor endurance test > 30".  3. Patient will report no more than 1 headache per week.    Long Term Goals: 8 weeks:  1. Patient will demonstrate compliance and understanding with home exercise program.  2. Patient will report no headaches after working as a .  3. Patient will improve FOTO to > 76% to improve ADL performance.      PLAN   Plan of care Certification: 4/4/2024 to 5/2/24.    Outpatient Physical Therapy 1 times weekly for 4 weeks to include the following interventions: Cervical/Lumbar Traction, Electrical Stimulation TENS, IFC NMES, Manual Therapy, Moist Heat/ Ice, Neuromuscular Re-ed, Patient Education, Self Care, Therapeutic Activities, Therapeutic Exercise, and dry needling.     Paul Wu, PT      I CERTIFY THE NEED FOR THESE SERVICES FURNISHED UNDER THIS PLAN OF " TREATMENT AND WHILE UNDER MY CARE   Physician's comments:     Physician's Signature: ___________________________________________________

## 2024-04-11 ENCOUNTER — CLINICAL SUPPORT (OUTPATIENT)
Dept: REHABILITATION | Facility: HOSPITAL | Age: 29
End: 2024-04-11
Payer: COMMERCIAL

## 2024-04-11 DIAGNOSIS — M53.82 DECREASED RANGE OF MOTION OF INTERVERTEBRAL DISCS OF CERVICAL SPINE: Primary | ICD-10-CM

## 2024-04-11 PROCEDURE — 97140 MANUAL THERAPY 1/> REGIONS: CPT | Mod: PN | Performed by: PHYSICAL THERAPIST

## 2024-04-11 PROCEDURE — 97014 ELECTRIC STIMULATION THERAPY: CPT | Mod: PN | Performed by: PHYSICAL THERAPIST

## 2024-04-11 PROCEDURE — 97110 THERAPEUTIC EXERCISES: CPT | Mod: PN | Performed by: PHYSICAL THERAPIST

## 2024-04-11 NOTE — PROGRESS NOTES
"[facility]   Physical Therapy Treatment Note      Name: Nae Escalante  Clinic Number: [id number]    Therapy Diagnosis:   Encounter Diagnosis   Name Primary?    Decreased range of motion of intervertebral discs of cervical spine Yes     Physician: Anirudh Wilson III, MD    Visit Date: 4/11/2024       Physician Orders: PT Eval and Treat   Medical Diagnosis: T14.8XXA (ICD-10-CM) - Muscle strain   Evaluation Date: 4/4/2024  Authorization Period Expiration: 3/22/25  Plan of Care Expiration: 5/3/24  Progress Note Due: 5/3/24  Visit # / Visits authorized: 1/20 (+1)  FOTO: 2/5  PTA: 0/6     Time In: 8:01 am  Time Out: 9:00 am  Total Billable Time: 45 + 10 e-stim minutes (Mtx2, TE, e-stim)      Precautions: Standard      Subjective     Patient reports: she woke up with her first headache this morning since evaluation. She attributes it to trying to max out the leg press yesterday.  She was compliant with home exercise program.  Response to previous treatment: decreased headaches  Functional change: decreased headaches    Pain: 5/10  Location: right neck/post neck     Objective      Objective Measures updated at progress report unless specified.     Treatment     Nae received the treatments listed below:      Nae received therapeutic exercises to develop strength, endurance, ROM, flexibility, posture, and core stabilization for 20 minutes including:    Anterior scalene stretch 3x20" holds  Cervical retraction: 15x5" holds     Deep neck flexor nod: 15x10" holds  Deep neck flexor nod with liftoff: 10x6" holds  Prone scapular retraction with Bilateral shoulder extension holds: 2x30" holds  Prone scapular retraction with T holds: 3x15" holds     Nae received the following manual therapy techniques: Joint mobilizations, Manual traction, Myofacial release, and Soft tissue Mobilization were applied to the: neck for 25 minutes:     Bilateral C2 UPA in prone  Manual intermittent cervical " distraction  SOR  Right upper cervical rotation mobs/PROM     See EMR under MEDIA for written consent provided.     Palpation Assessment to determine the necessity for Functional Dry Needling  right cervicogenic headache and UT.      Nae received the following supervised modalities after being cleared for contradictions: dry needling: Nae received TENS electrical stimulation for pain to the right suboccipitals. Electrical stimulation was applied to these needles at 4 Hz and a pulse of 250 µseconds for 10 minutes with a alf check. Nae reported treatment well without any adverse effects.      neuromuscular re-education activities to improve: Balance, Kinesthetic, Proprioception, and Posture for 0 minutes. The following activities were included:  Not today     therapeutic activities to improve functional performance for 0  minutes, including:  Not today    Patient Education and Home Exercises       Education provided:   - importance of home exercise program  - risks and benefits of dry needling - consent signed    Written Home Exercises Provided: Patient instructed to cont prior HEP. Exercises were reviewed and Nae was able to demonstrate them prior to the end of the session.  Nae demonstrated good  understanding of the education provided. See Electronic Medical Record under Patient Instructions for exercises provided during therapy sessions    Assessment     Nae is a 29 y.o. female referred to outpatient Physical Therapy with a medical diagnosis of muscle strain. Pt presents with right > left sided headaches occurring 2x per wk for about 1 month. Pain radiates from occiput to frontal area. + right cervical flexion rotation test and deep neck flexor endurance tests.   Good response to evaluation with 1 week of no headaches. Headache returned this morning most likely due to overstraining yesterday. No suboccipital tenderness today, but pain with C2-3 UPA on right again. Inc'd tenderness to right UT  "today.    Nae was agreeable to dry needling by a certified dry needling PT and signed a consent prior to treatment after being made aware of risks. 3x40 mm needles were inserted into right upper trap and fanned. Each were removed and placed in a sharps container. 2x25 mm needles were placed 1 fingerwidth right to C2 and C3. Needles were winded for grasp. Electrical stimulation was applied to these needles at 4 Hz and a pulse of 250 µseconds for 10 minutes with a nursing home check. Afterwards, needles were removed and placed into a sharps container. Nae reported a good response to treatment.     Nae Is progressing well towards her goals.   Patient prognosis is Excellent.     Patient will continue to benefit from skilled outpatient physical therapy to address the deficits listed in the problem list box on initial evaluation, provide pt/family education and to maximize pt's level of independence in the home and community environment.     Patient's spiritual, cultural and educational needs considered and pt agreeable to plan of care and goals.     Anticipated barriers to physical therapy: none    Goals:   Short Term Goals: 3-4 weeks:  1. Patient will demonstrate negative cervical flexion/rotation test to the right.   2. Patient will demonstrate deep neck flexor endurance test > 30".  3. Patient will report no more than 1 headache per week. MET     Long Term Goals: 8 weeks:  1. Patient will demonstrate compliance and understanding with home exercise program.  2. Patient will report no headaches after working as a .  3. Patient will improve FOTO to > 76% to improve ADL performance.    Plan     Cont with POC. Monitor response to Functional Dry Needling. Continue with Functional Dry Needling in POC as appropriate.     Plan of care Certification: 4/4/2024 to 5/2/24.     Outpatient Physical Therapy 1 times weekly for 4 weeks to include the following interventions: Cervical/Lumbar Traction, Electrical " Stimulation TENS, IFC NMES, Manual Therapy, Moist Heat/ Ice, Neuromuscular Re-ed, Patient Education, Self Care, Therapeutic Activities, Therapeutic Exercise, and dry needling.     Paul Wu, PT

## 2024-04-18 ENCOUNTER — CLINICAL SUPPORT (OUTPATIENT)
Dept: REHABILITATION | Facility: HOSPITAL | Age: 29
End: 2024-04-18
Payer: COMMERCIAL

## 2024-04-18 DIAGNOSIS — M53.82 DECREASED RANGE OF MOTION OF INTERVERTEBRAL DISCS OF CERVICAL SPINE: Primary | ICD-10-CM

## 2024-04-18 PROCEDURE — 97140 MANUAL THERAPY 1/> REGIONS: CPT | Mod: PN | Performed by: PHYSICAL THERAPIST

## 2024-04-18 PROCEDURE — 97112 NEUROMUSCULAR REEDUCATION: CPT | Mod: PN | Performed by: PHYSICAL THERAPIST

## 2024-04-18 PROCEDURE — 97110 THERAPEUTIC EXERCISES: CPT | Mod: PN | Performed by: PHYSICAL THERAPIST

## 2024-04-18 NOTE — PROGRESS NOTES
"[facility]   Physical Therapy Treatment Note      Name: Nae Escalante  Clinic Number: [id number]    Therapy Diagnosis:   Encounter Diagnosis   Name Primary?    Decreased range of motion of intervertebral discs of cervical spine Yes     Physician: Anirudh Wilson III, MD    Visit Date: 4/18/2024       Physician Orders: PT Eval and Treat   Medical Diagnosis: T14.8XXA (ICD-10-CM) - Muscle strain   Evaluation Date: 4/4/2024  Authorization Period Expiration: 3/22/25  Plan of Care Expiration: 5/3/24  Progress Note Due: 5/3/24  Visit # / Visits authorized: 2/20 (+1)  FOTO: Performed today: goal met  PTA: 0/6     Time In: 8:00 am  Time Out: 8:52 am  Total Billable Time: 52 minutes (Mt, TE, NMRx2)      Precautions: Standard      Subjective     Patient reports: no headache since last visit (1 wk ago). Only 1 incident of pressure in right temporal. 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  Response to previous treatment: decreased headaches  Functional change: decreased headaches    Pain: 1/10  Location: right neck/post neck     Objective      4/18/24:  Deep neck flexor endurance test: 45"     Treatment     Nae received the treatments listed below:      Nae received therapeutic exercises to develop strength, endurance, ROM, flexibility, posture, and core stabilization for 19 minutes including:    Double tennis ball SOR: 2 minutes   Anterior scalene stretch 3x20" holds  Prone Y: 3x10 1 lb each  CC SAPD: 7 pounds each 30x  CC Bear hugs: 10 pounds (height 6) 2x10     Nae received the following manual therapy techniques: Joint mobilizations, Manual traction, Myofacial release, and Soft tissue Mobilization were applied to the: neck for 8 minutes:     Bilateral C2 UPA in prone  Manual intermittent cervical distraction  SOR  Right upper cervical rotation mobs/PROM     NOT TODAY:  See EMR under MEDIA for written consent " "provided.     Palpation Assessment to determine the necessity for Functional Dry Needling  right cervicogenic headache and UT.      Nae DID NOT received the following supervised modalities after being cleared for contradictions: dry needling: Nae received TENS electrical stimulation for pain to the right suboccipitals. Electrical stimulation was applied to these needles at 4 Hz and a pulse of 250 µseconds for 00 minutes with a assisted check. Nae reported treatment well without any adverse effects.      neuromuscular re-education activities to improve: Balance, Kinesthetic, Proprioception, and Posture for 25 minutes. The following activities were included:    Prone Cervical retraction: 10x10" holds     Deep neck flexor nod: 15x10" holds  Deep neck flexor nod with liftoff: 10x6" holds - TEST only today  Prone scapular retraction with Bilateral shoulder extension holds: 2x30" holds  Prone scapular retraction with T holds: 3x15" holds     CC rows with contralateral hold: 13 pounds each 3x10 each     therapeutic activities to improve functional performance for 0  minutes, including:  Not today    Patient Education and Home Exercises       Education provided:   - importance of home exercise program  - risks and benefits of dry needling - consent signed    Written Home Exercises Provided: Patient instructed to cont prior HEP. Exercises were reviewed and Nae was able to demonstrate them prior to the end of the session.  Nae demonstrated good  understanding of the education provided. See Electronic Medical Record under Patient Instructions for exercises provided during therapy sessions    Assessment     Nae is a 29 y.o. female referred to outpatient Physical Therapy with a medical diagnosis of muscle strain. Pt presents with right > left sided headaches occurring 2x per wk for about 1 month.   More than doubled deep neck flexor endurance test since evaluation and only 1 headache since evaluation. Progressed " "periscapular strengthening today. If no headache in next wk, consider discharge next visit.        Nae Is progressing well towards her goals.   Patient prognosis is Excellent.     Patient will continue to benefit from skilled outpatient physical therapy to address the deficits listed in the problem list box on initial evaluation, provide pt/family education and to maximize pt's level of independence in the home and community environment.     Patient's spiritual, cultural and educational needs considered and pt agreeable to plan of care and goals.     Anticipated barriers to physical therapy: none    Goals:   Short Term Goals: 3-4 weeks:  1. Patient will demonstrate negative cervical flexion/rotation test to the right.   2. Patient will demonstrate deep neck flexor endurance test > 30". MET  3. Patient will report no more than 1 headache per week. MET     Long Term Goals: 8 weeks:  1. Patient will demonstrate compliance and understanding with home exercise program. MET  2. Patient will report no headaches after working as a .  3. Patient will improve FOTO to > 76% to improve ADL performance. MET    Plan     Cont with POC. Monitor response to Functional Dry Needling. Continue with Functional Dry Needling in POC as appropriate.     Plan of care Certification: 4/4/2024 to 5/2/24.     Outpatient Physical Therapy 1 times weekly for 4 weeks to include the following interventions: Cervical/Lumbar Traction, Electrical Stimulation TENS, IFC NMES, Manual Therapy, Moist Heat/ Ice, Neuromuscular Re-ed, Patient Education, Self Care, Therapeutic Activities, Therapeutic Exercise, and dry needling.     Paul Wu, PT        "

## 2024-04-25 ENCOUNTER — DOCUMENTATION ONLY (OUTPATIENT)
Dept: REHABILITATION | Facility: HOSPITAL | Age: 29
End: 2024-04-25
Payer: COMMERCIAL

## 2024-04-25 PROBLEM — M53.82 DECREASED RANGE OF MOTION OF INTERVERTEBRAL DISCS OF CERVICAL SPINE: Status: RESOLVED | Noted: 2024-04-04 | Resolved: 2024-04-25

## 2024-04-25 NOTE — PROGRESS NOTES
PATIENT DISCHARGE:    Nae has been pain free for > 1 week and compliant with her home exercise program. She has met all goals (other than deep neck flexor endurance test has not been updated). Today's visit is not needed and she will be discharged.

## 2024-05-26 ENCOUNTER — PATIENT MESSAGE (OUTPATIENT)
Dept: INTERNAL MEDICINE | Facility: CLINIC | Age: 29
End: 2024-05-26
Payer: COMMERCIAL

## 2024-05-26 DIAGNOSIS — R63.5 WEIGHT INCREASE: Primary | ICD-10-CM

## 2024-05-27 ENCOUNTER — E-VISIT (OUTPATIENT)
Dept: INTERNAL MEDICINE | Facility: CLINIC | Age: 29
End: 2024-05-27
Payer: COMMERCIAL

## 2024-05-27 DIAGNOSIS — E66.09 CLASS 2 OBESITY DUE TO EXCESS CALORIES WITHOUT SERIOUS COMORBIDITY WITH BODY MASS INDEX (BMI) OF 35.0 TO 35.9 IN ADULT: Primary | ICD-10-CM

## 2024-05-27 PROCEDURE — 99421 OL DIG E/M SVC 5-10 MIN: CPT | Mod: ,,, | Performed by: INTERNAL MEDICINE

## 2024-05-28 PROBLEM — E66.812 CLASS 2 OBESITY DUE TO EXCESS CALORIES WITHOUT SERIOUS COMORBIDITY WITH BODY MASS INDEX (BMI) OF 35.0 TO 35.9 IN ADULT: Status: ACTIVE | Noted: 2024-05-28

## 2024-05-28 PROBLEM — E66.09 CLASS 2 OBESITY DUE TO EXCESS CALORIES WITHOUT SERIOUS COMORBIDITY WITH BODY MASS INDEX (BMI) OF 35.0 TO 35.9 IN ADULT: Status: ACTIVE | Noted: 2024-05-28

## 2024-05-28 NOTE — PROGRESS NOTES
Patient ID: Nae Escalante is a 29 y.o. female.    Chief Complaint: Medication Management (Entered automatically based on patient selection in Patient Portal.)    The patient initiated a request through [employer] on 5/27/2024 for evaluation and management with a chief complaint of Medication Management (Entered automatically based on patient selection in Patient Portal.)     I evaluated the questionnaire submission on 5/27/2024  .    [facility] P[facility] Evisit Medication    5/27/2024  1:23 PM CDT - Filed by Patient   Do you agree to participate in an E-Visit? Yes   If you have any of the following symptoms, please present to your local emergency room or call 911:  I acknowledge   Medication requests for narcotics will not be addressed via an E-Visit.  Please schedule an appointment. I acknowledge   Choose the state of your primary residence [place]   Are you pregnant, could you be pregnant, or are you breast feeding? None of the above   Do you want to address a new or existing medication? I would like to start a new medication that I do not already take   What is the main issue you would like addressed today? Medication to aid in weight loss   What is the name of the medication that you would like to start? Wegovy   Have you taken a similar medication in the past? No   Why are you requesting this particular medication? For Weight loss and insulin concerns    What medical condition is the  medication intended to treat? Obesity   Provide any additional information you feel is important. No known allergies   Please attach any relevant images or files    Are you able to take your vital signs? Yes   Systolic Blood Pressure: 117   Diastolic Blood Pressure: 76   Weight: 196   Height: 63   Pulse: 71   Temperature: 97.1   Respiration rate:    Pulse Oxygen:        BMI Readings from Last 1 Encounters:   03/22/24 35.54 kg/m²       Encounter Diagnosis   Name Primary?    Class 2 obesity due to excess calories without serious comorbidity  with body mass index (BMI) of 35.0 to 35.9 in adult Yes        No orders of the defined types were placed in this encounter.           No follow-ups on file.      E-Visit Time Tracking:    Day 1 Time (in minutes): 5    Total Time (in minutes): 5              No future appointments.            Disclaimer:  This note has been generated using voice-recognition software. There may be grammatical or spelling errors that have been missed during proof-reading

## 2024-07-11 ENCOUNTER — TELEPHONE (OUTPATIENT)
Dept: FAMILY MEDICINE | Facility: CLINIC | Age: 29
End: 2024-07-11
Payer: COMMERCIAL

## 2024-07-11 ENCOUNTER — HOSPITAL ENCOUNTER (OUTPATIENT)
Dept: RADIOLOGY | Facility: HOSPITAL | Age: 29
Discharge: HOME | End: 2024-07-11
Attending: STUDENT IN AN ORGANIZED HEALTH CARE EDUCATION/TRAINING PROGRAM
Payer: COMMERCIAL

## 2024-07-11 ENCOUNTER — OFFICE VISIT (OUTPATIENT)
Dept: FAMILY MEDICINE | Facility: CLINIC | Age: 29
End: 2024-07-11
Payer: COMMERCIAL

## 2024-07-11 ENCOUNTER — PATIENT MESSAGE (OUTPATIENT)
Dept: FAMILY MEDICINE | Facility: CLINIC | Age: 29
End: 2024-07-11
Payer: COMMERCIAL

## 2024-07-11 VITALS
SYSTOLIC BLOOD PRESSURE: 116 MMHG | BODY MASS INDEX: 35.58 KG/M2 | HEIGHT: 63 IN | OXYGEN SATURATION: 96 % | HEART RATE: 86 BPM | DIASTOLIC BLOOD PRESSURE: 80 MMHG | WEIGHT: 200.81 LBS

## 2024-07-11 DIAGNOSIS — M54.2 NECK PAIN: ICD-10-CM

## 2024-07-11 DIAGNOSIS — M54.2 NECK PAIN: Primary | ICD-10-CM

## 2024-07-11 DIAGNOSIS — M54.2 CERVICALGIA: ICD-10-CM

## 2024-07-11 PROCEDURE — 72040 X-RAY EXAM NECK SPINE 2-3 VW: CPT | Mod: TC,FY

## 2024-07-11 PROCEDURE — 72040 X-RAY EXAM NECK SPINE 2-3 VW: CPT | Mod: 26,,, | Performed by: RADIOLOGY

## 2024-07-11 PROCEDURE — 99999 PR PBB SHADOW E&M-EST. PATIENT-LVL IV: CPT | Mod: PBBFAC,,, | Performed by: STUDENT IN AN ORGANIZED HEALTH CARE EDUCATION/TRAINING PROGRAM

## 2024-07-11 RX ORDER — MELOXICAM 15 MG/1
15 TABLET ORAL DAILY
Qty: 20 TABLET | Refills: 2 | Status: SHIPPED | OUTPATIENT
Start: 2024-07-11

## 2024-07-11 RX ORDER — KETOROLAC TROMETHAMINE 30 MG/ML
60 INJECTION, SOLUTION INTRAMUSCULAR; INTRAVENOUS
Status: COMPLETED | OUTPATIENT
Start: 2024-07-11 | End: 2024-07-11

## 2024-07-11 RX ADMIN — KETOROLAC TROMETHAMINE 60 MG: 30 INJECTION, SOLUTION INTRAMUSCULAR; INTRAVENOUS at 02:07

## 2024-07-11 NOTE — PROGRESS NOTES
Progress Note  [facility]    Subjective:     Nae Escalante is a 29 y.o. year old female who presents to clinic for neck pain    Neck pain: Saw PCP in March for this. Had done PT which helped. Had a SLAP tear in the R shoulder that seems to be contributing to her neck pain. Today neck pain 7-8/10 that goes from occiput to upper traps and to the front of the neck. Neck feels very stiff. Feels like there is an elephant sitting on her neck. Worse with coaching gymnastic lifting girls, neck position with computer work. Last month had some pinky numbness but this resolved. Was getting better then this week got much worse. Thinks that spotting for gymnasts flared up neck. Currently doing PT for her SLAP tear, has not had surgery for this. Was a gymnast in college.     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     Answers submitted by the patient for this visit:  Review of Systems Questionnaire (Submitted on 7/11/2024)  activity change: No  unexpected weight change: No  neck pain: Yes  hearing loss: No  rhinorrhea: No  trouble swallowing: No  eye discharge: No  visual disturbance: No  chest tightness: No  wheezing: No  chest pain: No  palpitations: No  blood in stool: No  constipation: No  vomiting: No  diarrhea: No  polydipsia: No  polyuria: No  difficulty urinating: No  hematuria: No  menstrual problem: No  dysuria: No  joint swelling: No  arthralgias: No  headaches: No  weakness: No  confusion: No  dysphoric mood: No      Patient Active Problem List    Diagnosis Date Noted    Class 2 obesity due to excess calories without serious comorbidity with body mass index (BMI) of 35.0 to 35.9 in adult 05/28/2024    Nontraumatic incomplete tear of right rotator cuff 01/21/2022    Chronic right shoulder pain 01/21/2022    Decreased ROM of right shoulder  "01/21/2022    Decreased strength 01/21/2022    Instability of both shoulder joints 02/25/2021    Impingement syndrome of right shoulder 01/11/2021        Review of patient's allergies indicates:  No Known Allergies     No past medical history on file.   No past surgical history on file.   Family History   Problem Relation Name Age of Onset    Hyperlipidemia Mother      Hypertension Mother      Diabetes Father      Prostate cancer Father      Breast cancer Maternal Aunt      Colon cancer Neg Hx        Social History     Tobacco Use    Smoking status: Never    Smokeless tobacco: Never   Substance Use Topics    Alcohol use: Never        Objective:     Vitals:    07/11/24 1343   BP: 116/80   BP Location: Left arm   Patient Position: Sitting   BP Method: Large (Manual)   Pulse: 86   SpO2: 96%   Weight: 91.1 kg (200 lb 13.4 oz)   Height: 5' 3" (1.6 m)     BMI: 35.58    Gen: No apparent distress, well nourished and developed, appears stated age  CV: RRR, S1 and S2 present, no LE edema  Resp: CTAB, normal respiratory effort  MSK: Hypertonicity of paraspinal musculature in the cervical and upper thoracic spine  Neuro: Muscle strength 5/5 and sensation intact in UE b/l, negative spurling test    Assessment/Plan:     Nae Escalante is a 29 y.o. year old female who presents to clinic for neck pain    1. Neck pain  Will obtain x-ray and trial more potent antiinflammatory medication. Given pt has failed conservative treatment will obtain MRI for further evaluation. Medication, side effects and proper use discussed. Pt verbalized understanding and was in agreement with the plan.    - X-Ray Cervical Spine 2 or 3 Views; Future  - MRI Cervical Spine Without Contrast; Future  - meloxicam (MOBIC) 15 MG tablet; Take 1 tablet (15 mg total) by mouth once daily.  Dispense: 20 tablet; Refill: 2  - ketorolac injection 60 mg    2. Cervicalgia  As above  - MRI Cervical Spine Without Contrast; Future  - meloxicam (MOBIC) 15 MG tablet; Take " 1 tablet (15 mg total) by mouth once daily.  Dispense: 20 tablet; Refill: 2  - ketorolac injection 60 mg     Follow-up: PRN      Marvin Ro DO  Family Medicine

## 2024-07-11 NOTE — PATIENT INSTRUCTIONS
It is okay to take tylenol but nothing else with the meloxicam (mobic).     Take meloxicam daily for the next week, give yourself 4 days off and then take daily as needed.

## 2024-07-12 NOTE — TELEPHONE ENCOUNTER
Sent patient a voicemail with appt time and date and informed patient that they could call us to reschedule or that they can reschedule through the [facility] portal if the date or time does not work with their schedule.

## 2024-07-20 ENCOUNTER — HOSPITAL ENCOUNTER (OUTPATIENT)
Dept: RADIOLOGY | Facility: HOSPITAL | Age: 29
Discharge: HOME | End: 2024-07-20
Attending: STUDENT IN AN ORGANIZED HEALTH CARE EDUCATION/TRAINING PROGRAM
Payer: COMMERCIAL

## 2024-07-20 DIAGNOSIS — M54.2 CERVICALGIA: ICD-10-CM

## 2024-07-20 DIAGNOSIS — M54.2 NECK PAIN: ICD-10-CM

## 2024-07-20 PROCEDURE — 72141 MRI NECK SPINE W/O DYE: CPT | Mod: 26,,, | Performed by: RADIOLOGY

## 2024-07-20 PROCEDURE — 72141 MRI NECK SPINE W/O DYE: CPT | Mod: TC

## 2024-08-16 ENCOUNTER — OFFICE VISIT (OUTPATIENT)
Dept: OBSTETRICS AND GYNECOLOGY | Facility: CLINIC | Age: 29
End: 2024-08-16
Payer: COMMERCIAL

## 2024-08-16 VITALS
BODY MASS INDEX: 35.86 KG/M2 | SYSTOLIC BLOOD PRESSURE: 125 MMHG | DIASTOLIC BLOOD PRESSURE: 77 MMHG | HEIGHT: 63 IN | WEIGHT: 202.38 LBS

## 2024-08-16 DIAGNOSIS — Z30.9 ENCOUNTER FOR CONTRACEPTIVE MANAGEMENT, UNSPECIFIED TYPE: Primary | ICD-10-CM

## 2024-08-16 DIAGNOSIS — Z01.419 WELL WOMAN EXAM: ICD-10-CM

## 2024-08-16 LAB
B-HCG UR QL: NEGATIVE
CTP QC/QA: YES

## 2024-08-16 PROCEDURE — 99999 PR PBB SHADOW E&M-EST. PATIENT-LVL III: CPT | Mod: PBBFAC,,, | Performed by: STUDENT IN AN ORGANIZED HEALTH CARE EDUCATION/TRAINING PROGRAM

## 2024-08-16 RX ORDER — NORETHINDRONE ACETATE AND ETHINYL ESTRADIOL 1MG-20(21)
1 KIT ORAL DAILY
Qty: 90 TABLET | Refills: 3 | Status: SHIPPED | OUTPATIENT
Start: 2024-08-16 | End: 2025-08-16

## 2024-08-16 NOTE — PROGRESS NOTES
History & Physical  Gynecology      SUBJECTIVE:     Chief Complaint: Annual Exam       History of Present Illness:  29 y.o. female  here for annual GYN visit.   She describes her periods as regular, lasting 6 days with normal flow. She denies intermenstrual bleeding.   She denies vaginal itching, irritation, or discharge.  Patient is sexually active with 1 male partner.  She uses condoms for contraception but is interested in other birth control at this time.   She does not use tobacco, alcohol or drugs.   She is patient care representative and a gymnast . She lives with her partner and reports feeling safe at home.     Patient has no history of abnormal paps  Last Pap:  - NILM    Her maternal aunt was diagnosed with breast cancer in her 70's.     Review of patient's allergies indicates:  No Known Allergies    History reviewed. No pertinent past medical history.  History reviewed. No pertinent surgical history.  OB History          0    Para   0    Term   0       0    AB   0    Living   0         SAB   0    IAB   0    Ectopic   0    Multiple   0    Live Births   0               Family History   Problem Relation Name Age of Onset    Hyperlipidemia Mother      Hypertension Mother      Diabetes Father      Prostate cancer Father      Breast cancer Maternal Aunt      Colon cancer Neg Hx       Social History     Tobacco Use    Smoking status: Never    Smokeless tobacco: Never   Substance Use Topics    Alcohol use: Never    Drug use: Never       Current Outpatient Medications   Medication Sig    meloxicam (MOBIC) 15 MG tablet Take 1 tablet (15 mg total) by mouth once daily. (Patient not taking: Reported on 2024)     No current facility-administered medications for this visit.       Review of Systems:  Review of Systems   Constitutional:  Negative for chills, fatigue and fever.   HENT:  Negative for congestion.    Eyes:  Negative for visual disturbance.   Respiratory:  Negative for cough  and shortness of breath.    Cardiovascular:  Negative for chest pain and palpitations.   Gastrointestinal:  Negative for abdominal distention, abdominal pain, constipation, diarrhea, nausea and vomiting.   Genitourinary:  Negative for difficulty urinating, dysuria, hematuria, vaginal bleeding and vaginal discharge.   Skin:  Negative for rash.   Neurological:  Negative for dizziness, seizures, light-headedness and headaches.   Hematological:  Does not bruise/bleed easily.   Psychiatric/Behavioral:  Negative for dysphoric mood. The patient is not nervous/anxious.         OBJECTIVE:     Physical Exam:  Vitals:    08/16/24 1118   BP: 125/77      Physical Exam  Vitals and nursing note reviewed. Exam conducted with a chaperone present.   Constitutional:       General: She is not in acute distress.     Appearance: She is well-developed.   HENT:      Head: Normocephalic and atraumatic.   Eyes:      Pupils: Pupils are equal, round, and reactive to light.   Cardiovascular:      Rate and Rhythm: Normal rate and regular rhythm.   Pulmonary:      Effort: Pulmonary effort is normal. No respiratory distress.   Chest:   Breasts:     Right: Normal.      Left: Normal.   Abdominal:      General: There is no distension.      Palpations: Abdomen is soft. There is no mass.      Tenderness: There is no abdominal tenderness. There is no guarding.   Genitourinary:     Comments: SSE: Normal external female genitalia, normal urethral meatus, normal vaginal rugae, normal vaginal mucosa, no vaginal blood in canal, normal physiologic discharge, normal cervix, no adnexal masses palpated on bimanual exam.   Musculoskeletal:         General: Normal range of motion.      Cervical back: Normal range of motion and neck supple.   Skin:     General: Skin is warm and dry.   Neurological:      Mental Status: She is alert and oriented to person, place, and time.   Psychiatric:         Behavior: Behavior normal.         Thought Content: Thought content  normal.         Judgment: Judgment normal.         ASSESSMENT/PLAN:     1. Well woman exam  - Pap smear - Up to date  - Mammogram - At age 42  - Colonoscopy - At age 47  - Calcium and Vitamin D counseling  14 - 30 years old 1,300mg Ca/d; 600 IU vit D/d  31 - 50 years old 1,000 Ca/d; 600 IU vit D/d  51 - 70 year old: 1,200 Ca/d; 600 IU vit D/d  71+ years old 1,200 Ca/d; 800 IU vit D/d    2. Encounter for contraceptive management, unspecified type  - Counseled patient on contraceptive options with preference given to LARCs  - Patient desires trial of OCPs, no contraindications    Heri Baker M.D.  OB/GYN  [facility]

## 2024-08-21 ENCOUNTER — PATIENT MESSAGE (OUTPATIENT)
Dept: OBSTETRICS AND GYNECOLOGY | Facility: CLINIC | Age: 29
End: 2024-08-21
Payer: COMMERCIAL

## 2024-09-25 NOTE — LETTER
January 31, 2024      [facility]  [address]  [place] [address]  Phone: [phone]  Fax: [phone]       Patient: Nae Escalante   YOB: 1995  Date of Visit: 01/31/2024    To Whom It May Concern:    Dana Escalante  was at [facility] on 01/31/2024. The patient may return to work/school on 2/25/24 with no restrictions. If you have any questions or concerns, or if I can be of further assistance, please do not hesitate to contact me.    Sincerely,    Dashawn Bello MA      How Severe Are They?: mild Is This A New Presentation, Or A Follow-Up?: Skin Lesion

## 2024-11-18 ENCOUNTER — PATIENT MESSAGE (OUTPATIENT)
Dept: OBSTETRICS AND GYNECOLOGY | Facility: CLINIC | Age: 29
End: 2024-11-18
Payer: COMMERCIAL

## 2025-03-20 ENCOUNTER — OFFICE VISIT (OUTPATIENT)
Dept: FAMILY MEDICINE | Facility: CLINIC | Age: 30
End: 2025-03-20
Payer: COMMERCIAL

## 2025-03-20 DIAGNOSIS — S76.019A HIP STRAIN, UNSPECIFIED LATERALITY, INITIAL ENCOUNTER: Primary | ICD-10-CM

## 2025-03-20 PROCEDURE — 1160F RVW MEDS BY RX/DR IN RCRD: CPT | Mod: CPTII,95,,

## 2025-03-20 PROCEDURE — 98005 SYNCH AUDIO-VIDEO EST LOW 20: CPT | Mod: 95,,,

## 2025-03-20 PROCEDURE — 1159F MED LIST DOCD IN RCRD: CPT | Mod: CPTII,95,,

## 2025-03-20 RX ORDER — DICLOFENAC SODIUM 10 MG/G
2 GEL TOPICAL 4 TIMES DAILY
Qty: 100 G | Refills: 0 | Status: SHIPPED | OUTPATIENT
Start: 2025-03-20

## 2025-03-20 RX ORDER — IBUPROFEN 800 MG/1
800 TABLET ORAL 3 TIMES DAILY
Qty: 30 TABLET | Refills: 0 | Status: SHIPPED | OUTPATIENT
Start: 2025-03-20

## 2025-03-20 RX ORDER — METHOCARBAMOL 500 MG/1
500 TABLET, FILM COATED ORAL 4 TIMES DAILY
Qty: 40 TABLET | Refills: 0 | Status: SHIPPED | OUTPATIENT
Start: 2025-03-20 | End: 2025-03-31

## 2025-03-20 NOTE — PROGRESS NOTES
Telehealth Visit  [facility]      The patient location is: work  in [place]   The chief complaint leading to consultation is: hip strain  Visit type: audiovisual  Face to Face time with patient: 8 mins       HPI: Nae Escalante is a 29 y.o. year old female who presents for hip strain     History of Present Illness    CHIEF COMPLAINT:  Nae presents today for bilateral hip pain with associated weakness.    MUSCULOSKELETAL:  She reports bilateral hip pain for approximately 10 days, affecting both the inner thigh/groin area near the pelvis and outer hip near the glutes. Pain is exacerbated by laying flat, prolonged sitting, leg lifting, and hip abduction. The condition is impacting her gait and movement, causing tension and strain during walking, turning, and sitting. She notes decreased range of motion and weakness in the affected areas. Ice therapy provides temporary relief through numbness.She is currently taking ibuprofen 200mg for pain management. 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 Denies bladder and bowel dysfunction, numbness, and tingling.       ROS:  Constitutional: -chills, -fever  Respiratory: -cough, -shortness of breath  Cardiovascular: -chest pain  Gastrointestinal: -abdominal pain, -constipation, -diarrhea, -nausea, -vomiting  Neurological: -dizziness, -lightheadedness, -headaches  Musculoskeletal:  +limb pain, +limited movement, +difficulty walking, +pain with movement, +muscle tension, +lower extremity pain with movement       Answers submitted by the patient for this visit:  Review of Systems Questionnaire (Submitted on 3/20/2025)  activity change: No  unexpected weight change: No  rhinorrhea: No  trouble swallowing: No  visual disturbance: No  chest tightness: No  polyuria: No  difficulty urinating: No  menstrual problem: No  joint swelling: No  arthralgias: Yes  confusion: No  dysphoric mood: No      Physical Exam:     Gen- NAD, appears stated age  Resp-  Breathing comfortably on RA  Neuro- Alert, spontaneous movements  Psych- Calm, cooperative, logical thought process       Assessment and plan:      Assessment & Plan     IMPRESSION:  - Assessed bilateral hip pain with inner thigh and outer hip weakness, affecting gait.  - Opted for conservative treatment approach before considering imaging.  - Evaluated for red flags related to potential neurological involvement.    PAIN IN HIPS (RIGHT AND LEFT):  - Prescribed prescription-strength ibuprofen 800mg for pain management, to be taken as needed.  - Recommend Voltaren gel (diclofenac sodium topical gel) for topical application to affected areas.  - Recommend applying heating pads as needed to affected areas.  - Advised the patient to continue with icing and exercises.  - Ordered physical therapy for comprehensive treatment.  - Prescribed a muscle relaxant, to be taken as needed.    GENERAL MANAGEMENT AND FOLLOW-UP:  - Nae to continue icing affected areas.  - Ordered physical therapy evaluation and treatment.  - Educated patient on red flags to watch for, including bladder/bowel dysfunction, numbness in saddle region, and numbness/tingling in legs.  - Contact the office through the patient portal if symptoms do not improve within 2-3 weeks.     Follow-up: RTC prn     25 minutes of total time spent on the encounter, which includes face to face time and non-face to face time preparing to see the patient (eg, review of tests), Obtaining and/or reviewing separately obtained history, Documenting clinical information in the electronic or other health record, Independently interpreting results (not separately reported) and communicating results to the patient/family/caregiver, or Care coordination (not separately reported).      This service was not originating from a related E/M service provided within the previous 7 days nor will  to an E/M service or procedure within the next 24 hours or my soonest available  appointment.  Prevailing standard of care was able to be met in this audio-only visit.      This note was generated with the assistance of ambient listening technology. Verbal consent was obtained by the patient and accompanying visitor(s) for the recording of patient appointment to facilitate this note. I attest to having reviewed and edited the generated note for accuracy, though some syntax or spelling errors may persist. Please contact the author of this note for any clarification.      Amaya Colbert, PA-C [facility]

## 2025-04-08 ENCOUNTER — CLINICAL SUPPORT (OUTPATIENT)
Dept: OTHER | Facility: CLINIC | Age: 30
End: 2025-04-08

## 2025-04-08 DIAGNOSIS — Z00.8 ENCOUNTER FOR OTHER GENERAL EXAMINATION: ICD-10-CM

## 2025-04-09 VITALS
DIASTOLIC BLOOD PRESSURE: 84 MMHG | BODY MASS INDEX: 35.79 KG/M2 | WEIGHT: 202 LBS | HEIGHT: 63 IN | SYSTOLIC BLOOD PRESSURE: 124 MMHG

## 2025-04-09 LAB
HDLC SERPL-MCNC: 64 MG/DL
POC CHOLESTEROL, LDL (DOCK): 162 MG/DL
POC CHOLESTEROL, TOTAL: 237 MG/DL
POC GLUCOSE, FASTING: 90 MG/DL (ref 60–110)
TRIGL SERPL-MCNC: 64 MG/DL

## 2025-04-11 ENCOUNTER — RESULTS FOLLOW-UP (OUTPATIENT)
Dept: OTHER | Facility: CLINIC | Age: 30
End: 2025-04-11

## 2025-05-28 ENCOUNTER — PATIENT MESSAGE (OUTPATIENT)
Dept: FAMILY MEDICINE | Facility: CLINIC | Age: 30
End: 2025-05-28
Payer: COMMERCIAL

## 2025-05-29 ENCOUNTER — OFFICE VISIT (OUTPATIENT)
Dept: FAMILY MEDICINE | Facility: CLINIC | Age: 30
End: 2025-05-29
Payer: COMMERCIAL

## 2025-05-29 ENCOUNTER — HOSPITAL ENCOUNTER (OUTPATIENT)
Dept: CARDIOLOGY | Facility: HOSPITAL | Age: 30
Discharge: HOME | End: 2025-05-29
Payer: COMMERCIAL

## 2025-05-29 VITALS
DIASTOLIC BLOOD PRESSURE: 76 MMHG | SYSTOLIC BLOOD PRESSURE: 128 MMHG | BODY MASS INDEX: 36.52 KG/M2 | HEIGHT: 63 IN | WEIGHT: 206.13 LBS

## 2025-05-29 DIAGNOSIS — R09.89 CHEST CONGESTION: ICD-10-CM

## 2025-05-29 DIAGNOSIS — M72.2 PLANTAR FASCIITIS, BILATERAL: Primary | ICD-10-CM

## 2025-05-29 DIAGNOSIS — R60.0 BILATERAL LEG EDEMA: ICD-10-CM

## 2025-05-29 LAB
CTP QC/QA: YES
CTP QC/QA: YES
POC MOLECULAR INFLUENZA A AGN: NEGATIVE
POC MOLECULAR INFLUENZA B AGN: NEGATIVE
SARS-COV-2 RDRP RESP QL NAA+PROBE: NEGATIVE

## 2025-05-29 PROCEDURE — 99999 PR PBB SHADOW E&M-EST. PATIENT-LVL IV: CPT | Mod: PBBFAC,,,

## 2025-05-29 PROCEDURE — 93970 EXTREMITY STUDY: CPT

## 2025-05-29 PROCEDURE — 93970 EXTREMITY STUDY: CPT | Mod: 26,,, | Performed by: INTERNAL MEDICINE

## 2025-05-29 RX ORDER — FLUTICASONE PROPIONATE 50 MCG
1 SPRAY, SUSPENSION (ML) NASAL DAILY
Qty: 15.8 ML | Refills: 0 | Status: SHIPPED | OUTPATIENT
Start: 2025-05-29

## 2025-05-29 NOTE — PROGRESS NOTES
[facility]    5/29/2025      Subjective:       Patient ID:  Nae is a 30 y.o. female .  has no past medical history on file.    History of Present Illness    CHIEF COMPLAINT:  Nae presents today with sinus congestion and leg swelling.    UPPER RESPIRATORY SYMPTOMS:  She reports sinus symptoms including nasal congestion that began around May 23-24, approximately 5 days ago following a trip. She denies cough or sore throat. She has been taking Zyrtec and using nasal saline rinse for two days.    LOWER EXTREMITY SYMPTOMS:  She reports bilateral ankle swelling with puffiness that has improved with ice, rest, and compression. She experiences pain in the bottom of both feet when walking, particularly after 30 minutes of activity, ongoing for at least a week. She notes right-sided calf tenderness and tightness, with pain behind the knee upon leg elevation. She denies shortness of breath. Ibuprofen 800 mg provides temporary pain relief but symptoms return with continued activity.    PAST MEDICAL HISTORY:  She received podiatric care  in 2020, including toe injections and walking boot treatment for 4 weeks.    RECENT TRAVEL HISTORY:  She recently participated in glacier hiking during a cruise. Travel included a four-hour flight without compression socks.    SOCIAL HISTORY:  She is a non-smoker and discontinued birth control approximately 2 months ago.      ROS:  Constitutional: -chills, -fever  Respiratory: -cough, -shortness of breath  Cardiovascular: -chest pain  Gastrointestinal: -abdominal pain, -constipation, -diarrhea, -nausea, -vomiting  Neurological: -dizziness, -lightheadedness, -headaches  Nose: +nasal congestion  Musculoskeletal: +joint swelling, +lower extremity swelling, +limb pain, +pain with movement, +muscle tension, +muscle pain, +joint pain  Ears: +ear pressure, +ear pain           Problem List[1]      Last HgbA1C:    Lab Results   Component Value Date    HGBA1C 5.5  "03/20/2024    HGBA1C 5.8 (H) 03/21/2023    HGBA1C 5.6 04/27/2022         Last Lipid Panel:    Lab Results   Component Value Date    HDL 67 03/20/2024    HDL 64 03/21/2023    HDL 65 04/27/2022       Lab Results   Component Value Date    LDLCALC 141.0 03/20/2024    LDLCALC 143.2 03/21/2023    LDLCALC 126.4 04/27/2022       Lab Results   Component Value Date    TRIG 40 03/20/2024    TRIG 59 03/21/2023    TRIG 63 04/27/2022       Lab Results   Component Value Date    CHOLHDL 31.0 03/20/2024    CHOLHDL 29.2 03/21/2023    CHOLHDL 31.9 04/27/2022         Review of patient's allergies indicates:  No Known Allergies     Medication List with Changes/Refills   New Medications    FLUTICASONE PROPIONATE (FLONASE) 50 MCG/ACTUATION NASAL SPRAY    1 spray (50 mcg total) by Each Nostril route once daily.   Current Medications    DICLOFENAC SODIUM (VOLTAREN ARTHRITIS PAIN) 1 % GEL    Apply 2 g topically 4 (four) times daily.    IBUPROFEN (ADVIL,MOTRIN) 800 MG TABLET    Take 1 tablet (800 mg total) by mouth 3 (three) times daily.    MELOXICAM (MOBIC) 15 MG TABLET    Take 1 tablet (15 mg total) by mouth once daily.    NORETHINDRONE-ETHINYL ESTRADIOL (JUNEL FE 1/20) 1 MG-20 MCG (21)/75 MG (7) PER TABLET    Take 1 tablet by mouth once daily.               Objective:      /76 (BP Location: Right arm, Patient Position: Sitting)   Ht 5' 3" (1.6 m)   Wt 93.5 kg (206 lb 2.1 oz)   BMI 36.51 kg/m²   Estimated body mass index is 36.51 kg/m² as calculated from the following:    Height as of this encounter: 5' 3" (1.6 m).    Weight as of this encounter: 93.5 kg (206 lb 2.1 oz).    Physical Exam  Vitals reviewed.   Constitutional:       General: She is not in acute distress.     Appearance: She is obese.   HENT:      Head: Normocephalic and atraumatic.      Mouth/Throat:      Mouth: Mucous membranes are moist.      Pharynx: No oropharyngeal exudate or posterior oropharyngeal erythema.   Eyes:      Extraocular Movements: Extraocular " movements intact.      Conjunctiva/sclera: Conjunctivae normal.   Cardiovascular:      Rate and Rhythm: Normal rate and regular rhythm.   Pulmonary:      Effort: Pulmonary effort is normal. No respiratory distress.   Musculoskeletal:         General: Tenderness present. Normal range of motion.      Cervical back: Normal range of motion.      Right lower leg: Edema (trace) present.      Left lower leg: Edema (trace) present.   Skin:     General: Skin is warm.   Neurological:      Mental Status: She is alert.   Psychiatric:         Mood and Affect: Mood normal.         Behavior: Behavior normal.             Assessment and Plan:   1. Plantar fasciitis, bilateral  - Ambulatory referral/consult to Podiatry; Future    2. Chest congestion  - POCT COVID-19 Rapid Screening  - POCT Influenza A/B Molecular  - fluticasone propionate (FLONASE) 50 mcg/actuation nasal spray; 1 spray (50 mcg total) by Each Nostril route once daily.  Dispense: 15.8 mL; Refill: 0    3. Bilateral leg edema  - CV Ultrasound doppler venous legs bilat; Future     Assessment & Plan      IMPRESSION:  - Assessed sinus symptoms as likely viral, given recent travel and negative flu/COVID tests.  - Considered plantar fasciitis as potential cause of foot pain, based on symptoms and history.  - Evaluated leg swelling and calf tenderness  - Discussed potential causes of ear discomfort related to recent air travel and fluid buildup.    PLAN SUMMARY:  - Referred to podiatry for evaluation and management  - Recommend nasal saline rinses for symptom relief  - Recommended Mucinex DM for congestion  - Prescribed Flonase nasal spray with proper technique demonstration  - Ordered ultrasound to rule out blood clot and check for varicose veins  - Continue rest, ice application to ankles, compression socks, and leg elevation  - Contact office if symptoms persist beyond 10 days  - Seek emergency evaluation if chest pain or shortness of breath develops    1. Plantar  fasciitis, bilateral (Primary)  - Monitored bilateral foot pain occurring after walking for approximately 30 minutes, present for about a week and particularly noticeable in the morning.  - Pain is temporarily relieved with ibuprofen 800 mg but returns with activity.  - Nae has history of plantar fasciitis previously treated with injections and a boot.  - Referred to podiatry for evaluation and further management.  - Ambulatory referral/consult to Podiatry; Future    2. Chest congestion  - Monitored sinus symptoms which began after patient's trip on May 23rd-24th, lasting about 5 days with congestion and taste sensation.  - Test results were negative for flu and COVID, indicating likely viral sinusitis.  - Explained the viral nature and expected duration of illness.  - Prescribed Mucinex DM for congestion and Flonase nasal spray with demonstration of proper technique (aiming towards ears for better sinus passage coverage).  - Recommend nasal saline rinses for symptom relief.  - Instructed patient to contact office if symptoms persist beyond 10 days.  - POCT COVID-19 Rapid Screening  - POCT Influenza A/B Molecular  - fluticasone propionate (FLONASE) 50 mcg/actuation nasal spray; 1 spray (50 mcg total) by Each Nostril route once daily.  Dispense: 15.8 mL; Refill: 0    3. Bilateral leg edema  - Monitored ankle swelling, which has decreased with ice, rest, and compression.  - Nae reports tightness in calves with trace pain, but no significant pain or tenderness.  - Ordered leg ultrasound to rule out DVT and assess for varicose veins.  - Treatment plan includes elevating legs when not in use, wearing compression socks (especially for flights longer than 2 hours), and continuing rest and ice therapy.  - Instructed patient to seek emergency evaluation if chest pain or shortness of breath develops.  - CV Ultrasound doppler venous legs bilat; Future       The patient was informed of the following statements     Emergency  Care:Seek immediate medical attention in the emergency room if you experience any new or worsening symptoms, or if your current condition significantly changes or becomes more severe.  Patient Acknowledgment: Patient verbalizes understanding of the plan and agrees to proceed with the recommended care.    Follow Up:  RTC as needed   Future Appointments   Date Time Provider Department Center   5/29/2025 11:00 AM VASCULAR, CARDIOLOGY [facility]   6/19/2025 12:15 PM Marsha Mike DPM [facility] [address]                       Other Orders Placed This Visit:  Orders Placed This Encounter   Procedures    Ambulatory referral/consult to Podiatry    CV Ultrasound doppler venous legs bilat    POCT COVID-19 Rapid Screening    POCT Influenza A/B Molecular         This note was generated with the assistance of ambient listening technology. Verbal consent was obtained by the patient and accompanying visitor(s) for the recording of patient appointment to facilitate this note. I attest to having reviewed and edited the generated note for accuracy, though some syntax or spelling errors may persist. Please contact the author of this note for any clarification.        Elise Willson PA-C        I spent a total of 35 minutes on the day of the visit.This includes face to face time and non-face to face time preparing to see the patient (eg, review of tests), obtaining and/or reviewing separately obtained history, documenting clinical information in the electronic or other health record, independently interpreting results and communicating results to the patient/family/caregiver, or care coordinator.          [1]   Patient Active Problem List  Diagnosis    Impingement syndrome of right shoulder    Instability of both shoulder joints    Nontraumatic incomplete tear of right rotator cuff    Chronic right shoulder pain    Decreased ROM of right shoulder    Decreased strength    Class 2 obesity due to excess calories without  serious comorbidity with body mass index (BMI) of 35.0 to 35.9 in adult

## 2025-05-30 ENCOUNTER — RESULTS FOLLOW-UP (OUTPATIENT)
Dept: FAMILY MEDICINE | Facility: CLINIC | Age: 30
End: 2025-05-30

## 2025-06-19 ENCOUNTER — OFFICE VISIT (OUTPATIENT)
Dept: PODIATRY | Facility: CLINIC | Age: 30
End: 2025-06-19
Payer: COMMERCIAL

## 2025-06-19 VITALS
WEIGHT: 206.13 LBS | SYSTOLIC BLOOD PRESSURE: 111 MMHG | HEIGHT: 63 IN | DIASTOLIC BLOOD PRESSURE: 76 MMHG | HEART RATE: 82 BPM | BODY MASS INDEX: 36.52 KG/M2

## 2025-06-19 DIAGNOSIS — M84.375A STRESS REACTION OF LEFT FOOT, INITIAL ENCOUNTER: Primary | ICD-10-CM

## 2025-06-19 DIAGNOSIS — M72.2 PLANTAR FASCIITIS, BILATERAL: ICD-10-CM

## 2025-06-19 DIAGNOSIS — R52 PAIN: Primary | ICD-10-CM

## 2025-06-19 PROCEDURE — 3078F DIAST BP <80 MM HG: CPT | Mod: CPTII,S$GLB,, | Performed by: PODIATRIST

## 2025-06-19 PROCEDURE — 99203 OFFICE O/P NEW LOW 30 MIN: CPT | Mod: S$GLB,,, | Performed by: PODIATRIST

## 2025-06-19 PROCEDURE — 1160F RVW MEDS BY RX/DR IN RCRD: CPT | Mod: CPTII,S$GLB,, | Performed by: PODIATRIST

## 2025-06-19 PROCEDURE — 1159F MED LIST DOCD IN RCRD: CPT | Mod: CPTII,S$GLB,, | Performed by: PODIATRIST

## 2025-06-19 PROCEDURE — 99999 PR PBB SHADOW E&M-EST. PATIENT-LVL IV: CPT | Mod: PBBFAC,,, | Performed by: PODIATRIST

## 2025-06-19 PROCEDURE — 3074F SYST BP LT 130 MM HG: CPT | Mod: CPTII,S$GLB,, | Performed by: PODIATRIST

## 2025-06-19 PROCEDURE — 3008F BODY MASS INDEX DOCD: CPT | Mod: CPTII,S$GLB,, | Performed by: PODIATRIST

## 2025-06-19 NOTE — PROGRESS NOTES
PODIATRY    Patient ID:  Nae Escalante is a 30 y.o. female     Chief Complaint   Patient presents with    Foot Pain     L    Heel Pain     L foot and heel pain lasting since May  May have rolled foot from [place] trip in May         MEDICAL DECISION MAKING      Problem List Items Addressed This Visit    None  Visit Diagnoses         Stress reaction of left foot, initial encounter    -  Primary    Relevant Orders    MRI Midfoot WO Contrast LT    AIR CAST WALKER BOOT FOR HOME USE      Plantar fasciitis, bilateral        Relevant Orders    MRI Midfoot WO Contrast LT    AIR CAST WALKER BOOT FOR HOME USE            I counseled the patient on the patient's conditions, their implications and medical management.   Exacerbation of chronic condition.   Prior xrays reviewed.  Unremarkable.  MRI ordered for further evaluation.   CAM boot offload for acute exacerbation, wear 2-3 weeks.    She has ibuprofen 800 TID as needed and topical NSAIDs.  Continue as needed.        HPI:   Nae Escalante is a 30 y.o. female who presents to clinic with concerns of Foot Pain (L) and Heel Pain (L foot and heel pain lasting since May/May have rolled foot from [place] trip in May)   She has history of same pain a few years ago.     Recently, she tried elevating and wearing her [place].   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  Pushing off on her foot makes the pain worse, at the medial and lateral foot and ankle.    Pain exacerbated due to activity.       Patient Active Problem List  Diagnosis    Impingement syndrome of right shoulder    Instability of both shoulder joints    Nontraumatic incomplete tear of right rotator cuff    Chronic right shoulder pain    Decreased ROM of right shoulder    Decreased strength    Class 2 obesity due to excess calories without serious comorbidity with body mass index (BMI) of 35.0 to 35.9 in adult         Current Outpatient Medications on File Prior to Visit   Medication Sig Dispense Refill     "diclofenac sodium (VOLTAREN ARTHRITIS PAIN) 1 % Gel Apply 2 g topically 4 (four) times daily. 100 g 0    fluticasone propionate (FLONASE) 50 mcg/actuation nasal spray 1 spray (50 mcg total) by Each Nostril route once daily. 15.8 mL 0    ibuprofen (ADVIL,MOTRIN) 800 MG tablet Take 1 tablet (800 mg total) by mouth 3 (three) times daily. 30 tablet 0    meloxicam (MOBIC) 15 MG tablet Take 1 tablet (15 mg total) by mouth once daily. (Patient not taking: Reported on 8/16/2024) 20 tablet 2    norethindrone-ethinyl estradiol (JUNEL FE 1/20) 1 mg-20 mcg (21)/75 mg (7) per tablet Take 1 tablet by mouth once daily. (Patient not taking: Reported on 6/19/2025) 84 tablet 3     No current facility-administered medications on file prior to visit.           Review of patient's allergies indicates:  No Known Allergies      ROS:  General ROS: negative for  chills, fatigue or fever  Cardiovascular ROS: no chest pain or dyspnea on exertion  Musculoskeletal ROS: negative for joint pain or joint stiffness.  Negative for loss of strength.  Positive for foot pain.   Neuro ROS: Negative for syncope, numbness, or muscle weakness  Skin ROS: Negative for rash, itching or nail/hair changes.         OBJECTIVE:     Vitals:    06/19/25 1216   BP: 111/76   Pulse: 82   Weight: 93.5 kg (206 lb 2.1 oz)   Height: 5' 3" (1.6 m)        Lower extremity exam:  Vasc:   Palpable pedal pulses.   Feet appropriately warm to touch.   Cap refill time is within normal limits   Edema:  Trace    Neurological:    Light touch, proprioception, and Sharp/dull sensation are all intact.   There is no Tinel's along the tarsal tunnel.    Mulders click:   absent  Reflexes:   2+    Derm:   No open lesions, macerations, or rashes  Bruising:  absent  Redness:  absent  Pedal hair:  present    MSK:    tenderness to palpation lateral and medial foot and anterior ankle.    Foot arch type:  Flat foot  tightness to the Achilles tendon with ROM of affected foot.   There is no pain with " the lateral heel squeeze/compression  test.     Pending MRI report.

## 2025-06-26 ENCOUNTER — HOSPITAL ENCOUNTER (OUTPATIENT)
Dept: RADIOLOGY | Facility: OTHER | Age: 30
Discharge: HOME | End: 2025-06-26
Attending: PODIATRIST
Payer: COMMERCIAL

## 2025-06-26 DIAGNOSIS — M84.375A STRESS REACTION OF LEFT FOOT, INITIAL ENCOUNTER: ICD-10-CM

## 2025-06-26 DIAGNOSIS — M72.2 PLANTAR FASCIITIS, BILATERAL: ICD-10-CM

## 2025-06-26 PROCEDURE — 73718 MRI LOWER EXTREMITY W/O DYE: CPT | Mod: TC,LT

## 2025-08-01 ENCOUNTER — OFFICE VISIT (OUTPATIENT)
Dept: OBSTETRICS AND GYNECOLOGY | Facility: CLINIC | Age: 30
End: 2025-08-01
Payer: COMMERCIAL

## 2025-08-01 VITALS
BODY MASS INDEX: 35.89 KG/M2 | SYSTOLIC BLOOD PRESSURE: 123 MMHG | DIASTOLIC BLOOD PRESSURE: 83 MMHG | WEIGHT: 202.63 LBS

## 2025-08-01 DIAGNOSIS — Z31.69 ENCOUNTER FOR GENERAL COUNSELING AND ADVICE ON PROCREATION: Primary | ICD-10-CM

## 2025-08-01 PROCEDURE — 99999 PR PBB SHADOW E&M-EST. PATIENT-LVL III: CPT | Mod: PBBFAC,,, | Performed by: OBSTETRICS & GYNECOLOGY

## 2025-08-01 NOTE — PROGRESS NOTES
OBSTETRIC HISTORY:   OB History          0    Para   0    Term   0       0    AB   0    Living   0         SAB   0    IAB   0    Ectopic   0    Multiple   0    Live Births   0                  COMPREHENSIVE GYN HISTORY:  PAP History: Denies abnormal Paps.  Infection History: Denies STDs. Denies PID.  Benign History: Denies uterine fibroids. Denies ovarian cysts. Denies endometriosis.   Cancer History: Denies cervical cancer. Denies uterine cancer or hyperplasia. Denies ovarian cancer. Denies vulvar cancer or pre-cancer. Denies vaginal cancer or pre-cancer. Denies breast cancer. Denies colon cancer.  Sexual Activity History:   reports being sexually active and has had partner(s) who are male.   Menstrual History:  Every month , flows for 7 days. Moderate (Q 2 HRS) flow.  Dysmenorrhea History: Reports  dysmenorrhea.  Contraception History: None    HPI:   30 y.o.  Patient's last menstrual period was 2025.   Patient is  here complaining of desire to be pregnant. Was off and on birth control since honeymoon in 2024 and not pregnant. Reviewed menstrual nate and average menses length since January is 27 days. Has not done OPK. Had some misconception about trying to get pregnant. Discussed you ovulate 14 days before next period. Don't have frequent sex prior to ovulation have intentional sex. Will do Clear plan smiley face x 3 months then RTO if not pregnant for semen analysis and HSG. Will consider start time as January since was on and off birth control.         PE:   /83   Wt 91.9 kg (202 lb 9.6 oz)   LMP 2025   BMI 35.89 kg/m²     ASSESSMENT/PLAN:  Procreational counseling  RTO PRN

## 2025-08-19 ENCOUNTER — TELEPHONE (OUTPATIENT)
Dept: OBSTETRICS AND GYNECOLOGY | Facility: CLINIC | Age: 30
End: 2025-08-19
Payer: COMMERCIAL

## 2025-08-20 ENCOUNTER — CLINICAL SUPPORT (OUTPATIENT)
Dept: OBSTETRICS AND GYNECOLOGY | Facility: CLINIC | Age: 30
End: 2025-08-20
Payer: COMMERCIAL

## 2025-08-20 ENCOUNTER — PATIENT MESSAGE (OUTPATIENT)
Dept: OBSTETRICS AND GYNECOLOGY | Facility: CLINIC | Age: 30
End: 2025-08-20

## 2025-08-20 DIAGNOSIS — N91.2 AMENORRHEA: Primary | ICD-10-CM

## 2025-08-22 ENCOUNTER — TELEPHONE (OUTPATIENT)
Dept: OBSTETRICS AND GYNECOLOGY | Facility: CLINIC | Age: 30
End: 2025-08-22
Payer: COMMERCIAL